# Patient Record
Sex: FEMALE | Race: OTHER | HISPANIC OR LATINO | Employment: FULL TIME | URBAN - METROPOLITAN AREA
[De-identification: names, ages, dates, MRNs, and addresses within clinical notes are randomized per-mention and may not be internally consistent; named-entity substitution may affect disease eponyms.]

---

## 2017-02-13 ENCOUNTER — HOSPITAL ENCOUNTER (OUTPATIENT)
Facility: HOSPITAL | Age: 22
Discharge: HOME | End: 2017-02-13
Attending: OBSTETRICS & GYNECOLOGY | Admitting: OBSTETRICS & GYNECOLOGY
Payer: COMMERCIAL

## 2017-02-13 PROBLEM — O20.9 VAGINAL BLEEDING BEFORE 22 WEEKS GESTATION: Status: ACTIVE | Noted: 2017-02-13

## 2017-02-13 PROCEDURE — 87086 URINE CULTURE/COLONY COUNT: CPT | Performed by: OBSTETRICS & GYNECOLOGY

## 2017-02-13 PROCEDURE — 99203 OFFICE O/P NEW LOW 30 MIN: CPT

## 2017-02-13 PROCEDURE — 81001 URINALYSIS AUTO W/SCOPE: CPT | Performed by: OBSTETRICS & GYNECOLOGY

## 2017-02-13 RX ORDER — METRONIDAZOLE 500 MG/1
500 TABLET ORAL 2 TIMES DAILY
Qty: 14 TABLET | Refills: 0 | Status: SHIPPED | OUTPATIENT
Start: 2017-02-13 | End: 2017-02-20

## 2017-02-14 VITALS
HEART RATE: 90 BPM | RESPIRATION RATE: 16 BRPM | SYSTOLIC BLOOD PRESSURE: 119 MMHG | TEMPERATURE: 98.4 F | DIASTOLIC BLOOD PRESSURE: 63 MMHG | OXYGEN SATURATION: 100 %

## 2017-02-14 LAB
BACTERIA UR QL AUTO: ABNORMAL /HPF
BILIRUB UR QL STRIP: NEGATIVE
CLARITY UR: CLEAR
COLOR UR: YELLOW
GLUCOSE UR STRIP-MCNC: NEGATIVE MG/DL
HGB UR QL STRIP.AUTO: NEGATIVE
KETONES UR STRIP-MCNC: NEGATIVE MG/DL
LEUKOCYTE ESTERASE UR QL STRIP: ABNORMAL
NITRITE UR QL STRIP: NEGATIVE
NON-SQ EPI CELLS URNS QL MICRO: ABNORMAL /HPF
PH UR STRIP.AUTO: 7 [PH] (ref 4.5–8)
PROT UR STRIP-MCNC: NEGATIVE MG/DL
RBC #/AREA URNS AUTO: ABNORMAL /HPF
SP GR UR STRIP.AUTO: 1.01 (ref 1–1.03)
UROBILINOGEN UR QL STRIP.AUTO: 0.2 E.U./DL
WBC #/AREA URNS AUTO: ABNORMAL /HPF

## 2017-02-15 LAB — BACTERIA UR CULT: NORMAL

## 2017-07-19 ENCOUNTER — HOSPITAL ENCOUNTER (EMERGENCY)
Facility: HOSPITAL | Age: 22
Discharge: HOME | End: 2017-07-20
Attending: EMERGENCY MEDICINE
Payer: COMMERCIAL

## 2017-07-19 DIAGNOSIS — N76.4 LABIAL ABSCESS: Primary | ICD-10-CM

## 2017-07-19 RX ORDER — IBUPROFEN 600 MG/1
600 TABLET ORAL ONCE
Status: COMPLETED | OUTPATIENT
Start: 2017-07-19 | End: 2017-07-19

## 2017-07-19 RX ORDER — OXYCODONE HYDROCHLORIDE AND ACETAMINOPHEN 5; 325 MG/1; MG/1
1 TABLET ORAL ONCE
Status: COMPLETED | OUTPATIENT
Start: 2017-07-19 | End: 2017-07-19

## 2017-07-19 RX ADMIN — OXYCODONE HYDROCHLORIDE AND ACETAMINOPHEN 1 TABLET: 5; 325 TABLET ORAL at 23:12

## 2017-07-19 RX ADMIN — IBUPROFEN 600 MG: 600 TABLET, FILM COATED ORAL at 23:12

## 2017-07-20 VITALS
TEMPERATURE: 99.2 F | SYSTOLIC BLOOD PRESSURE: 132 MMHG | OXYGEN SATURATION: 99 % | DIASTOLIC BLOOD PRESSURE: 77 MMHG | RESPIRATION RATE: 16 BRPM | HEART RATE: 87 BPM

## 2017-07-20 PROCEDURE — 99283 EMERGENCY DEPT VISIT LOW MDM: CPT

## 2017-07-20 RX ORDER — OXYCODONE HYDROCHLORIDE AND ACETAMINOPHEN 5; 325 MG/1; MG/1
1 TABLET ORAL EVERY 8 HOURS PRN
Qty: 15 TABLET | Refills: 0 | Status: SHIPPED | OUTPATIENT
Start: 2017-07-20 | End: 2017-07-25

## 2017-07-20 RX ORDER — CLINDAMYCIN HYDROCHLORIDE 150 MG/1
300 CAPSULE ORAL ONCE
Status: COMPLETED | OUTPATIENT
Start: 2017-07-20 | End: 2017-07-20

## 2017-07-20 RX ORDER — CLINDAMYCIN HYDROCHLORIDE 150 MG/1
300 CAPSULE ORAL EVERY 8 HOURS SCHEDULED
Qty: 21 CAPSULE | Refills: 0 | Status: SHIPPED | OUTPATIENT
Start: 2017-07-20 | End: 2017-07-20

## 2017-07-20 RX ORDER — CLINDAMYCIN HYDROCHLORIDE 150 MG/1
300 CAPSULE ORAL EVERY 8 HOURS SCHEDULED
Qty: 42 CAPSULE | Refills: 0 | Status: SHIPPED | OUTPATIENT
Start: 2017-07-20 | End: 2017-07-27

## 2017-07-20 RX ORDER — IBUPROFEN 800 MG/1
800 TABLET ORAL EVERY 8 HOURS PRN
Qty: 30 TABLET | Refills: 0 | Status: SHIPPED | OUTPATIENT
Start: 2017-07-20 | End: 2018-08-23

## 2017-07-20 RX ADMIN — CLINDAMYCIN HYDROCHLORIDE 300 MG: 150 CAPSULE ORAL at 01:09

## 2017-08-02 ENCOUNTER — HOSPITAL ENCOUNTER (EMERGENCY)
Facility: HOSPITAL | Age: 22
Discharge: HOME | End: 2017-08-02
Payer: COMMERCIAL

## 2017-08-02 VITALS
HEART RATE: 76 BPM | OXYGEN SATURATION: 100 % | DIASTOLIC BLOOD PRESSURE: 66 MMHG | SYSTOLIC BLOOD PRESSURE: 136 MMHG | RESPIRATION RATE: 16 BRPM | TEMPERATURE: 97.9 F | WEIGHT: 203 LBS

## 2017-08-02 DIAGNOSIS — S91.111A LACERATION OF RIGHT GREAT TOE WITHOUT FOREIGN BODY WITHOUT DAMAGE TO NAIL, INITIAL ENCOUNTER: Primary | ICD-10-CM

## 2017-08-02 DIAGNOSIS — Z23 NEED FOR TETANUS BOOSTER: ICD-10-CM

## 2017-08-02 PROCEDURE — 99282 EMERGENCY DEPT VISIT SF MDM: CPT

## 2017-08-02 PROCEDURE — 90715 TDAP VACCINE 7 YRS/> IM: CPT | Performed by: PHYSICIAN ASSISTANT

## 2017-08-02 PROCEDURE — 90471 IMMUNIZATION ADMIN: CPT

## 2017-08-02 RX ADMIN — TETANUS TOXOID, REDUCED DIPHTHERIA TOXOID AND ACELLULAR PERTUSSIS VACCINE, ADSORBED 0.5 ML: 5; 2.5; 8; 8; 2.5 SUSPENSION INTRAMUSCULAR at 22:41

## 2018-03-28 ENCOUNTER — HOSPITAL ENCOUNTER (EMERGENCY)
Facility: HOSPITAL | Age: 23
Discharge: HOME | End: 2018-03-28
Admitting: EMERGENCY MEDICINE
Payer: MEDICARE

## 2018-03-28 VITALS
HEART RATE: 94 BPM | WEIGHT: 187 LBS | RESPIRATION RATE: 16 BRPM | OXYGEN SATURATION: 98 % | DIASTOLIC BLOOD PRESSURE: 81 MMHG | TEMPERATURE: 99.5 F | SYSTOLIC BLOOD PRESSURE: 137 MMHG

## 2018-03-28 DIAGNOSIS — J02.9 PHARYNGITIS: Primary | ICD-10-CM

## 2018-03-28 LAB — S PYO AG THROAT QL: NEGATIVE

## 2018-03-28 PROCEDURE — 99283 EMERGENCY DEPT VISIT LOW MDM: CPT

## 2018-03-28 PROCEDURE — 87070 CULTURE OTHR SPECIMN AEROBIC: CPT | Performed by: PHYSICIAN ASSISTANT

## 2018-03-28 PROCEDURE — 87430 STREP A AG IA: CPT | Performed by: PHYSICIAN ASSISTANT

## 2018-03-28 NOTE — DISCHARGE INSTRUCTIONS

## 2018-03-28 NOTE — ED PROVIDER NOTES
History  Chief Complaint   Patient presents with    Sore Throat     Sore throat since Sunday  Also had fever of 103 yesterday  Has been taking Tylenol  Also had diarrhea Sunday and Monday  23year old female presents today complaining of fever and sore throat for the last 3 days  Also admits to "a bit of a cough" and diarrhea  Denies sputum production  Has been taking tylenol for her symptoms  Family member sick with same symptoms  Denies headache, neck pain, chest pain, shortness of breath, abdominal pain, nausea, vomiting  Denies past medical history  Prior to Admission Medications   Prescriptions Last Dose Informant Patient Reported? Taking? Prenatal Multivit-Min-Fe-FA (PRENATAL VITAMINS PO)   Yes No   Sig: Take 1 tablet by mouth   ibuprofen (MOTRIN) 800 mg tablet   No No   Sig: Take 1 tablet by mouth every 8 (eight) hours as needed for mild pain or moderate pain for up to 10 days      Facility-Administered Medications: None       Past Medical History:   Diagnosis Date    Medical history non-contributory        Past Surgical History:   Procedure Laterality Date    NO PAST SURGERIES         History reviewed  No pertinent family history  I have reviewed and agree with the history as documented  Social History   Substance Use Topics    Smoking status: Never Smoker    Smokeless tobacco: Never Used    Alcohol use No        Review of Systems   Constitutional: Positive for fever  HENT: Positive for sore throat  Respiratory: Positive for cough  All other systems reviewed and are negative        Physical Exam  ED Triage Vitals [03/28/18 1526]   Temperature Pulse Respirations Blood Pressure SpO2   99 5 °F (37 5 °C) 94 16 137/81 98 %      Temp Source Heart Rate Source Patient Position - Orthostatic VS BP Location FiO2 (%)   Temporal Monitor Sitting Right arm --      Pain Score       8           Orthostatic Vital Signs  Vitals:    03/28/18 1526   BP: 137/81   Pulse: 94   Patient Position - Orthostatic VS: Sitting       Physical Exam   Constitutional: She appears well-developed and well-nourished  No distress  HENT:   Head: Normocephalic and atraumatic  Mouth/Throat: Oropharynx is clear and moist  No oropharyngeal exudate  Eyes: Conjunctivae and EOM are normal    Neck: Normal range of motion  Neck supple  Cardiovascular: Normal rate, regular rhythm and normal heart sounds  Pulmonary/Chest: Effort normal and breath sounds normal  No respiratory distress  She has no wheezes  Abdominal: Soft  She exhibits no distension  There is no tenderness  Musculoskeletal: Normal range of motion  Neurological: She is alert  Skin: Skin is warm and dry  Capillary refill takes less than 2 seconds  No rash noted  She is not diaphoretic  Psychiatric: She has a normal mood and affect  ED Medications  Medications - No data to display    Diagnostic Studies  Results Reviewed     Procedure Component Value Units Date/Time    Rapid Beta strep screen [[id number]]  (Normal) Collected:  03/28/18 1628    Lab Status:  Final result Specimen:  Throat from Throat Updated:  03/28/18 1715     Rapid Strep A Screen Negative    Throat culture [[id number]] Collected:  03/28/18 1628    Lab Status: In process Specimen:  Throat from Throat Updated:  03/28/18 1715                 No orders to display              Procedures  Procedures       Phone Contacts  ED Phone Contact    ED Course  ED Course                                MDM  CritCare Time    Disposition  Final diagnoses:   Pharyngitis     Time reflects when diagnosis was documented in both MDM as applicable and the Disposition within this note     Time User Action Codes Description Comment    3/28/2018  5:33 PM Sussy Benítez Add [Z77 21,  W27  3XXA] Accidental needlestick injury with exposure to body fluid     3/28/2018  5:34 PM Sussy Benítez Remove [I12 35,  W27  3XXA] Accidental needlestick injury with exposure to body fluid     3/28/2018  5:34 PM [address] [J02 9] Pharyngitis       ED Disposition     ED Disposition Condition Comment    Discharge  Castro Abdi discharge to home/self care  Condition at discharge: Good        Follow-up Information     Follow up With Specialties Details Why [address] [facility] Family Medicine Schedule an appointment as soon as possible for a visit  [address] [place] [address]  [id number]  [phone]        Patient's Medications   Discharge Prescriptions    No medications on file     No discharge procedures on file      ED Provider  Electronically Signed by           Dharmesh Carreon PA-C  03/28/18 1734

## 2018-03-30 LAB — BACTERIA THROAT CULT: NORMAL

## 2018-08-22 ENCOUNTER — HOSPITAL ENCOUNTER (EMERGENCY)
Facility: HOSPITAL | Age: 23
Discharge: HOME | End: 2018-08-22
Attending: EMERGENCY MEDICINE
Payer: MEDICARE

## 2018-08-22 VITALS
DIASTOLIC BLOOD PRESSURE: 67 MMHG | HEART RATE: 97 BPM | TEMPERATURE: 97.5 F | RESPIRATION RATE: 18 BRPM | OXYGEN SATURATION: 98 % | SYSTOLIC BLOOD PRESSURE: 138 MMHG

## 2018-08-22 DIAGNOSIS — L50.9 URTICARIA: Primary | ICD-10-CM

## 2018-08-22 DIAGNOSIS — T78.40XA ALLERGIC REACTION, INITIAL ENCOUNTER: ICD-10-CM

## 2018-08-22 PROCEDURE — 99283 EMERGENCY DEPT VISIT LOW MDM: CPT

## 2018-08-22 RX ORDER — PREDNISONE 10 MG/1
TABLET ORAL
Qty: 42 TABLET | Refills: 0 | Status: SHIPPED | OUTPATIENT
Start: 2018-08-22 | End: 2022-01-28 | Stop reason: ALTCHOICE

## 2018-08-22 RX ORDER — PREDNISONE 20 MG/1
60 TABLET ORAL ONCE
Status: COMPLETED | OUTPATIENT
Start: 2018-08-22 | End: 2018-08-22

## 2018-08-22 RX ORDER — FAMOTIDINE 20 MG/1
20 TABLET, FILM COATED ORAL ONCE
Status: COMPLETED | OUTPATIENT
Start: 2018-08-22 | End: 2018-08-22

## 2018-08-22 RX ORDER — FAMOTIDINE 20 MG/1
20 TABLET, FILM COATED ORAL 2 TIMES DAILY
Qty: 10 TABLET | Refills: 0 | Status: SHIPPED | OUTPATIENT
Start: 2018-08-22 | End: 2018-08-27

## 2018-08-22 RX ORDER — DIPHENHYDRAMINE HCL 25 MG
25 TABLET ORAL EVERY 6 HOURS PRN
Qty: 30 TABLET | Refills: 0 | Status: SHIPPED | OUTPATIENT
Start: 2018-08-22

## 2018-08-22 RX ORDER — DIPHENHYDRAMINE HCL 25 MG
50 TABLET ORAL ONCE
Status: COMPLETED | OUTPATIENT
Start: 2018-08-22 | End: 2018-08-22

## 2018-08-22 RX ADMIN — PREDNISONE 60 MG: 20 TABLET ORAL at 12:54

## 2018-08-22 RX ADMIN — DIPHENHYDRAMINE HCL 50 MG: 25 TABLET ORAL at 12:54

## 2018-08-22 RX ADMIN — FAMOTIDINE 20 MG: 20 TABLET ORAL at 12:54

## 2018-08-22 NOTE — ED PROVIDER NOTES
History  Chief Complaint   Patient presents with    Allergic Reaction     pt reports allergic reaction to unknown source  pt c/o hives to generalized body, swelling of eye left greater than right  pt reports recent cold symptoms taking medication but reports hx of medication use  pt denies difficulty swallowing or breathing  pt reports swelling since 0700 today     Patient is a 38-year-old female with no significant past medical history presents for evaluation of hives and eye swelling  She states that she woke up this morning around 7 at a m  and her left upper eyelid and right lower eyelid were swollen and mildly itchy  She states that she went to work and started with itching to her body and noticed hives  Her eyelid swelling worsened so she came in for evaluation  She has not taken any medications for her symptoms  She states she's had a mild cold over the last 2 days including sore throat, cough, runny nose, sneezing and mild headache  She has been taking dayquil and flonase  She states no other new medications and no hx of same in the past with these medications  She has an allergy to peanut oil but she denies coming into contact with this to her knowledge  She denies any new foods, soaps, lotions, detergents, etc  No sick contacts  She denies fever, chills, syncope, lightheadedness, nausea, vomiting, diarrhea, shortness of breath, wheezing, stridor, trouble swallowing, other facial swelling, tongue/lip swelling,             Prior to Admission Medications   Prescriptions Last Dose Informant Patient Reported? Taking?    Prenatal Multivit-Min-Fe-FA (PRENATAL VITAMINS PO)   Yes No   Sig: Take 1 tablet by mouth   ibuprofen (MOTRIN) 800 mg tablet   No No   Sig: Take 1 tablet by mouth every 8 (eight) hours as needed for mild pain or moderate pain for up to 10 days      Facility-Administered Medications: None       Past Medical History:   Diagnosis Date    Medical history non-contributory        Past Surgical History:   Procedure Laterality Date    NO PAST SURGERIES         History reviewed  No pertinent family history  I have reviewed and agree with the history as documented  Social History   Substance Use Topics    Smoking status: Never Smoker    Smokeless tobacco: Never Used    Alcohol use No        Review of Systems   Constitutional: Negative for chills and fever  HENT: Positive for congestion, facial swelling, sneezing and sore throat  Negative for ear pain and trouble swallowing  Eyelid swelling   Eyes: Positive for itching  Negative for pain and visual disturbance  Respiratory: Negative for shortness of breath, wheezing and stridor  Cardiovascular: Negative for chest pain and palpitations  Gastrointestinal: Negative for abdominal pain, diarrhea, nausea and vomiting  Musculoskeletal: Negative for arthralgias  Skin: Positive for rash  Hives to body   Neurological: Negative for dizziness, syncope, light-headedness and headaches  All other systems reviewed and are negative  Physical Exam  Physical Exam   Constitutional: She is oriented to person, place, and time  Vital signs are normal  She appears well-developed and well-nourished  She is active  Non-toxic appearance  No distress  HENT:   Head: Normocephalic and atraumatic  Right Ear: External ear normal    Left Ear: External ear normal    Nose: Nose normal    Mouth/Throat: Oropharynx is clear and moist  No oropharyngeal exudate  Eyes: Conjunctivae and EOM are normal  Pupils are equal, round, and reactive to light  Right eye exhibits no discharge, no exudate and no hordeolum  No foreign body present in the right eye  Left eye exhibits no discharge, no exudate and no hordeolum  No foreign body present in the left eye  Left upper eyelid swelling, right lower eyelid swelling  Mild itch  No cobblestoning noted to conjunctiva  No FB noted  No scleral injection/redness  No stye or drainage noted      Neck: Normal range of motion  Cardiovascular: Normal rate, regular rhythm and normal heart sounds  Exam reveals no gallop and no friction rub  No murmur heard  Pulmonary/Chest: Effort normal and breath sounds normal  No respiratory distress  She has no wheezes  She has no rales  Abdominal: Soft  Bowel sounds are normal  She exhibits no distension  There is no tenderness  There is no guarding  Musculoskeletal: Normal range of motion  Neurological: She is alert and oriented to person, place, and time  Skin: Skin is warm and dry  Capillary refill takes less than 2 seconds  She is not diaphoretic  Few isolated urticaria noted to the body (right flank, back, thighs)  Pruritic and blanchable  Psychiatric: She has a normal mood and affect  Her behavior is normal    Nursing note and vitals reviewed  Vital Signs  ED Triage Vitals [08/22/18 1218]   Temperature Pulse Respirations Blood Pressure SpO2   97 5 °F (36 4 °C) 97 18 138/67 98 %      Temp Source Heart Rate Source Patient Position - Orthostatic VS BP Location FiO2 (%)   Temporal Monitor Sitting Right arm --      Pain Score       --           Vitals:    08/22/18 1218   BP: 138/67   Pulse: 97   Patient Position - Orthostatic VS: Sitting       Visual Acuity      ED Medications  Medications   diphenhydrAMINE (BENADRYL) tablet 50 mg (50 mg Oral Given 8/22/18 1254)   predniSONE tablet 60 mg (60 mg Oral Given 8/22/18 1254)   famotidine (PEPCID) tablet 20 mg (20 mg Oral Given 8/22/18 1254)       Diagnostic Studies  Results Reviewed     None                 No orders to display              Procedures  Procedures       Phone Contacts  ED Phone Contact    ED Course                               MDM  Number of Diagnoses or Management Options  Allergic reaction, initial encounter:   Urticaria:   Diagnosis management comments: Allergic reaction/urticaria that started this morning at 7am  No medications taken for her symptoms  No GI or respiratory involvement   No lightheadedness or syncope  She is well appearing, non toxic and in NAD  Vital signs are stable  Will give benadryl, prednisone, and pepcid and observe  Patient with improvement of urticaria and eyelid swelling  Still with some left eyelid swelling but patient feels like it is getting better  Patient discharged after 1hr observation (7hrs after onset of symptoms) with no worsening symptoms  Patient stable at discharge  Will discharge home with prednisone taper, benadryl and pepcid  Discontinue OTC medications she was taking  Instructed to follow up with PCP and discussed strict return precautions  Patient states understanding and agrees with plan  Patient Progress  Patient progress: stable    CritCare Time    Disposition  Final diagnoses:   Urticaria   Allergic reaction, initial encounter     Time reflects when diagnosis was documented in both MDM as applicable and the Disposition within this note     Time User Action Codes Description Comment    8/22/2018  2:09 PM Radha Marie Add [L50 9] Urticaria     8/22/2018  2:09 PM Radha Marie Add [T78 40XA] Allergic reaction, initial encounter       ED Disposition     ED Disposition Condition Comment    Discharge  Tio Houston discharge to home/self care      Condition at discharge: Good        Follow-up Information     Follow up With Specialties Details Why Contact Info Additional [id number] [facility] Internal Medicine Schedule an appointment as soon as possible for a visit in 1 day  [facility] [phone] [facility] Family Medicine Schedule an appointment as soon as possible for a visit As needed if you do not have a doctor  [address] [place] [address] [address] [address] [phone]       [address] Ophthalmology Schedule an appointment as soon as possible for a visit in 1 day  [address] [facility] [address] Emergency Department Emergency Medicine  If symptoms worsen or new symptoms arise as discussed  [address] [facility] ED, [address] [facility]  , [place], [place], [address]          Discharge Medication List as of 8/22/2018  2:16 PM      START taking these medications    Details   diphenhydrAMINE (BENADRYL) 25 mg tablet Take 1 tablet (25 mg total) by mouth every 6 (six) hours as needed for itching, Starting Wed 8/22/2018, Print      famotidine (PEPCID) 20 mg tablet Take 1 tablet (20 mg total) by mouth 2 (two) times a day for 5 days, Starting Wed 8/22/2018, Until Mon 8/27/2018, Print      predniSONE 10 mg tablet Take 6 tabs day 1 and 2, take 5 tabs day 3 and 4, take 4 tabs day 5 and 6, take 3 tabs day 7 and 8, take 2 tabs day 9 and 10, take 1 tab day 11 and 12, Print         CONTINUE these medications which have NOT CHANGED    Details   ibuprofen (MOTRIN) 800 mg tablet Take 1 tablet by mouth every 8 (eight) hours as needed for mild pain or moderate pain for up to 10 days, Starting u 7/20/2017, Until Sun 7/30/2017, Print      Prenatal Multivit-Min-Fe-FA (PRENATAL VITAMINS PO) Take 1 tablet by mouth, Until Discontinued, Historical Med           No discharge procedures on file      ED Provider  Electronically Signed by           Demetri Castillo PA-C  08/22/18 1704 [facility]  08/22/18 4196

## 2018-08-22 NOTE — DISCHARGE INSTRUCTIONS
Urticaria   WHAT YOU NEED TO KNOW:   Urticaria is also called hives  Hives can change size and shape, and appear anywhere on your skin  They can be mild or severe and last from a few minutes to a few days  Hives may be a sign of a severe allergic reaction called anaphylaxis that needs immediate treatment  Urticaria that lasts longer than 6 weeks may be a chronic condition that needs long-term treatment  DISCHARGE INSTRUCTIONS:   Call 911 for signs or symptoms of anaphylaxis,  such as trouble breathing, swelling in your mouth or throat, or wheezing  You may also have itching, a rash, or feel like you are going to faint  Return to the emergency department if:   · Your heart is beating faster than it normally does  · You have cramping or severe pain in your abdomen  Contact your healthcare provider if:   · You have a fever  · Your skin still itches 24 hours after you take your medicine  · You still have hives after 7 days  · Your joints are painful and swollen  · You have questions or concerns about your condition or care  Medicines:   · Epinephrine  is used to treat severe allergic reactions such as anaphylaxis  · Antihistamines  decrease mild symptoms such as itching or a rash  · Steroids  decrease redness, pain, and swelling  · Take your medicine as directed  Contact your healthcare provider if you think your medicine is not helping or if you have side effects  Tell him of her if you are allergic to any medicine  Keep a list of the medicines, vitamins, and herbs you take  Include the amounts, and when and why you take them  Bring the list or the pill bottles to follow-up visits  Carry your medicine list with you in case of an emergency  Steps to take for signs or symptoms of anaphylaxis:   · Immediately  give 1 shot of epinephrine only into the outer thigh muscle  · Leave the shot in place  as directed   Your healthcare provider may recommend you leave it in place for up to 10 seconds before you remove it  This helps make sure all of the epinephrine is delivered  · Call 911 and go to the emergency department,  even if the shot improved symptoms  Do not drive yourself  Bring the used epinephrine shot with you  Safety precautions to take if you are at risk for anaphylaxis:   · Keep 2 shots of epinephrine with you at all times  You may need a second shot, because epinephrine only works for about 20 minutes and symptoms may return  Your healthcare provider can show you and family members how to give the shot  Check the expiration date every month and replace it before it expires  · Create an action plan  Your healthcare provider can help you create a written plan that explains the allergy and an emergency plan to treat a reaction  The plan explains when to give a second epinephrine shot if symptoms return or do not improve after the first  Give copies of the action plan and emergency instructions to family members, work and school staff, and  providers  Show them how to give a shot of epinephrine  · Be careful when you exercise  If you have had exercise-induced anaphylaxis, do not exercise right after you eat  Stop exercising right away if you start to develop any signs or symptoms of anaphylaxis  You may first feel tired, warm, or have itchy skin  Hives, swelling, and severe breathing problems may develop if you continue to exercise  · Carry medical alert identification  Wear medical alert jewelry or carry a card that explains the allergy  Ask your healthcare provider where to get these items  · Keep a record of triggers and symptoms  Record everything you eat, drink, or apply to your skin for 3 weeks  Include stressful events and what you were doing right before your hives started  Bring the record with you to follow-up visits with your healthcare provider  Manage urticaria:   · Cool your skin  This may help decrease itching   Apply a cool pack to your hives  Dip a hand towel in cool water, wring it out, and place it on your hives  You may also soak your skin in a cool oatmeal bath  · Do not rub your hives  This can irritate your skin and cause more hives  · Wear loose clothing  Tight clothes may irritate your skin and cause more hives  · Manage stress  Stress may trigger hives, or make them worse  Learn new ways to relax, such as deep breathing  Follow up with your healthcare provider as directed:  Write down your questions so you remember to ask them during your visits  © 2017 [employer]  Information is for End User's use only and may not be sold, redistributed or otherwise used for commercial purposes  All illustrations and images included in CareNotes® are the copyrighted property of [employer]  or [employer]  The above information is an  only  It is not intended as medical advice for individual conditions or treatments  Talk to your doctor, nurse or pharmacist before following any medical regimen to see if it is safe and effective for you  General Allergic Reaction   WHAT YOU NEED TO KNOW:   An allergic reaction is your body's response to an allergen  Allergens include medicines, food, insect stings, animal dander, mold, latex, chemicals, and dust mites  Pollen from trees, grass, and weeds can also cause an allergic reaction  DISCHARGE INSTRUCTIONS:   Return to the emergency department if:   · You have a skin rash, hives, swelling, or itching that gets worse  · You have trouble breathing, shortness of breath, wheezing, or coughing  · Your throat tightens, or your lips or tongue swell  · You have trouble swallowing or speaking  · You have dizziness, lightheadedness, fainting, or confusion  · You have nausea, vomiting, diarrhea, or abdominal cramps  · You have chest pain or tightness    Contact your healthcare provider if:   · You have questions or concerns about your condition or care  Medicines:   · Medicines  may be given to relieve certain allergy symptoms such as itching, sneezing, and swelling  You may take them as a pill or use drops in your nose or eyes  Topical treatments may be given to put directly on your skin to help decrease itching or swelling  · Take your medicine as directed  Contact your healthcare provider if you think your medicine is not helping or if you have side effects  Tell him of her if you are allergic to any medicine  Keep a list of the medicines, vitamins, and herbs you take  Include the amounts, and when and why you take them  Bring the list or the pill bottles to follow-up visits  Carry your medicine list with you in case of an emergency  Follow up with your healthcare provider as directed:  Write down your questions so you remember to ask them during your visits  Self-care:   · Avoid the allergen  that you think may have caused your allergic reaction  · Use cold compresses  on your skin or eyes if they were affected by the allergic reaction  Cold compresses may help to soothe your skin or eyes  · Rinse your nasal passages  with a saline solution  Daily rinsing may help clear your nose of allergens  · Do not smoke  Your allergy symptoms may decrease if you are not around smoke  Nicotine and other chemicals in cigarettes and cigars can also cause lung damage  Ask your healthcare provider for information if you currently smoke and need help to quit  E-cigarettes or smokeless tobacco still contain nicotine  Talk to your healthcare provider before you use these products  © 2017 [employer]  Information is for End User's use only and may not be sold, redistributed or otherwise used for commercial purposes  All illustrations and images included in CareNotes® are the copyrighted property of [employer]  or [employer]  The above information is an  only   It is not intended as medical advice for individual conditions or treatments  Talk to your doctor, nurse or pharmacist before following any medical regimen to see if it is safe and effective for you

## 2018-08-23 ENCOUNTER — HOSPITAL ENCOUNTER (EMERGENCY)
Facility: HOSPITAL | Age: 23
Discharge: HOME | End: 2018-08-23
Attending: EMERGENCY MEDICINE | Admitting: EMERGENCY MEDICINE
Payer: MEDICARE

## 2018-08-23 VITALS
HEART RATE: 113 BPM | RESPIRATION RATE: 16 BRPM | OXYGEN SATURATION: 96 % | TEMPERATURE: 99.1 F | SYSTOLIC BLOOD PRESSURE: 127 MMHG | WEIGHT: 191.58 LBS | DIASTOLIC BLOOD PRESSURE: 69 MMHG

## 2018-08-23 DIAGNOSIS — L50.9 URTICARIA: ICD-10-CM

## 2018-08-23 DIAGNOSIS — T78.40XA ALLERGIC REACTION, INITIAL ENCOUNTER: Primary | ICD-10-CM

## 2018-08-23 PROCEDURE — 96374 THER/PROPH/DIAG INJ IV PUSH: CPT

## 2018-08-23 PROCEDURE — 99284 EMERGENCY DEPT VISIT MOD MDM: CPT

## 2018-08-23 PROCEDURE — 96372 THER/PROPH/DIAG INJ SC/IM: CPT

## 2018-08-23 RX ORDER — EPINEPHRINE 0.3 MG/.3ML
0.3 INJECTION SUBCUTANEOUS ONCE
Qty: 0.3 ML | Refills: 1 | Status: SHIPPED | OUTPATIENT
Start: 2018-08-23 | End: 2018-08-24

## 2018-08-23 RX ORDER — EPINEPHRINE 1 MG/ML
0.3 INJECTION, SOLUTION, CONCENTRATE INTRAVENOUS ONCE
Status: COMPLETED | OUTPATIENT
Start: 2018-08-23 | End: 2018-08-23

## 2018-08-23 RX ORDER — DIPHENHYDRAMINE HYDROCHLORIDE 50 MG/ML
25 INJECTION INTRAMUSCULAR; INTRAVENOUS ONCE
Status: COMPLETED | OUTPATIENT
Start: 2018-08-23 | End: 2018-08-23

## 2018-08-23 RX ADMIN — EPINEPHRINE 0.3 MG: 1 INJECTION, SOLUTION, CONCENTRATE INTRAVENOUS at 09:59

## 2018-08-23 RX ADMIN — DIPHENHYDRAMINE HYDROCHLORIDE 25 MG: 50 INJECTION, SOLUTION INTRAMUSCULAR; INTRAVENOUS at 10:01

## 2018-08-23 NOTE — ED NOTES
Pt states feeling better and says "I feel I can breath now"  Pt generalized hives improving at this time        Britney Childress RN  08/23/18 1016

## 2018-08-23 NOTE — DISCHARGE INSTRUCTIONS
General Allergic Reaction   WHAT YOU NEED TO KNOW:   An allergic reaction is your body's response to an allergen  Allergens include medicines, food, insect stings, animal dander, mold, latex, chemicals, and dust mites  Pollen from trees, grass, and weeds can also cause an allergic reaction  DISCHARGE INSTRUCTIONS:   Return to the emergency department if:   · You have a skin rash, hives, swelling, or itching that gets worse  · You have trouble breathing, shortness of breath, wheezing, or coughing  · Your throat tightens, or your lips or tongue swell  · You have trouble swallowing or speaking  · You have dizziness, lightheadedness, fainting, or confusion  · You have nausea, vomiting, diarrhea, or abdominal cramps  · You have chest pain or tightness  Contact your healthcare provider if:   · You have questions or concerns about your condition or care  Medicines:   · Medicines  may be given to relieve certain allergy symptoms such as itching, sneezing, and swelling  You may take them as a pill or use drops in your nose or eyes  Topical treatments may be given to put directly on your skin to help decrease itching or swelling  · Take your medicine as directed  Contact your healthcare provider if you think your medicine is not helping or if you have side effects  Tell him of her if you are allergic to any medicine  Keep a list of the medicines, vitamins, and herbs you take  Include the amounts, and when and why you take them  Bring the list or the pill bottles to follow-up visits  Carry your medicine list with you in case of an emergency  Follow up with your healthcare provider as directed:  Write down your questions so you remember to ask them during your visits  Self-care:   · Avoid the allergen  that you think may have caused your allergic reaction  · Use cold compresses  on your skin or eyes if they were affected by the allergic reaction   Cold compresses may help to soothe your skin or eyes     · Rinse your nasal passages  with a saline solution  Daily rinsing may help clear your nose of allergens  · Do not smoke  Your allergy symptoms may decrease if you are not around smoke  Nicotine and other chemicals in cigarettes and cigars can also cause lung damage  Ask your healthcare provider for information if you currently smoke and need help to quit  E-cigarettes or smokeless tobacco still contain nicotine  Talk to your healthcare provider before you use these products  © 2017 [employer]  Information is for End User's use only and may not be sold, redistributed or otherwise used for commercial purposes  All illustrations and images included in CareNotes® are the copyrighted property of A D A M [employer]  or [employer] Mcgu[employer]  The above information is an  only  It is not intended as medical advice for individual conditions or treatments  Talk to your doctor, nurse or pharmacist before following any medical regimen to see if it is safe and effective for you  Urticaria   WHAT YOU NEED TO KNOW:   Urticaria is also called hives  Hives can change size and shape, and appear anywhere on your skin  They can be mild or severe and last from a few minutes to a few days  Hives may be a sign of a severe allergic reaction called anaphylaxis that needs immediate treatment  Urticaria that lasts longer than 6 weeks may be a chronic condition that needs long-term treatment  DISCHARGE INSTRUCTIONS:   Call 911 for signs or symptoms of anaphylaxis,  such as trouble breathing, swelling in your mouth or throat, or wheezing  You may also have itching, a rash, or feel like you are going to faint  Return to the emergency department if:   · Your heart is beating faster than it normally does  · You have cramping or severe pain in your abdomen  Contact your healthcare provider if:   · You have a fever  · Your skin still itches 24 hours after you take your medicine      · You still have hives after 7 days  · Your joints are painful and swollen  · You have questions or concerns about your condition or care  Medicines:   · Epinephrine  is used to treat severe allergic reactions such as anaphylaxis  · Antihistamines  decrease mild symptoms such as itching or a rash  · Steroids  decrease redness, pain, and swelling  · Take your medicine as directed  Contact your healthcare provider if you think your medicine is not helping or if you have side effects  Tell him of her if you are allergic to any medicine  Keep a list of the medicines, vitamins, and herbs you take  Include the amounts, and when and why you take them  Bring the list or the pill bottles to follow-up visits  Carry your medicine list with you in case of an emergency  Steps to take for signs or symptoms of anaphylaxis:   · Immediately  give 1 shot of epinephrine only into the outer thigh muscle  · Leave the shot in place  as directed  Your healthcare provider may recommend you leave it in place for up to 10 seconds before you remove it  This helps make sure all of the epinephrine is delivered  · Call 911 and go to the emergency department,  even if the shot improved symptoms  Do not drive yourself  Bring the used epinephrine shot with you  Safety precautions to take if you are at risk for anaphylaxis:   · Keep 2 shots of epinephrine with you at all times  You may need a second shot, because epinephrine only works for about 20 minutes and symptoms may return  Your healthcare provider can show you and family members how to give the shot  Check the expiration date every month and replace it before it expires  · Create an action plan  Your healthcare provider can help you create a written plan that explains the allergy and an emergency plan to treat a reaction   The plan explains when to give a second epinephrine shot if symptoms return or do not improve after the first  Give copies of the action plan and emergency instructions to family members, work and school staff, and  providers  Show them how to give a shot of epinephrine  · Be careful when you exercise  If you have had exercise-induced anaphylaxis, do not exercise right after you eat  Stop exercising right away if you start to develop any signs or symptoms of anaphylaxis  You may first feel tired, warm, or have itchy skin  Hives, swelling, and severe breathing problems may develop if you continue to exercise  · Carry medical alert identification  Wear medical alert jewelry or carry a card that explains the allergy  Ask your healthcare provider where to get these items  · Keep a record of triggers and symptoms  Record everything you eat, drink, or apply to your skin for 3 weeks  Include stressful events and what you were doing right before your hives started  Bring the record with you to follow-up visits with your healthcare provider  Manage urticaria:   · Cool your skin  This may help decrease itching  Apply a cool pack to your hives  Dip a hand towel in cool water, wring it out, and place it on your hives  You may also soak your skin in a cool oatmeal bath  · Do not rub your hives  This can irritate your skin and cause more hives  · Wear loose clothing  Tight clothes may irritate your skin and cause more hives  · Manage stress  Stress may trigger hives, or make them worse  Learn new ways to relax, such as deep breathing  Follow up with your healthcare provider as directed:  Write down your questions so you remember to ask them during your visits  © 2017 [employer] Information is for End User's use only and may not be sold, redistributed or otherwise used for commercial purposes  All illustrations and images included in CareNotes® are the copyrighted property of [email or web address]  or [employer] Mcguire  The above information is an  only   It is not intended as medical advice for individual conditions or treatments  Talk to your doctor, nurse or pharmacist before following any medical regimen to see if it is safe and effective for you

## 2018-08-23 NOTE — ED PROVIDER NOTES
History  Chief Complaint   Patient presents with    Allergic Reaction     Pt  reports allergic reaction since yesterday seen yesterday for same given PO meds and discharged  Hives are worse and is now sob  Also reports difficulty swallowing  Took prednisone, pepcid, and Banophen today  Also has facial swelling   Shortness of Breath       History provided by:  Patient  Allergic Reaction   Presenting symptoms: itching, rash and swelling    Severity:  Moderate  Prior allergic episodes:  No prior episodes  Context: not cosmetics, not medications, not new detergents/soaps and not nuts    Relieved by:  Nothing  Worsened by:  Nothing  Ineffective treatments:  Antihistamines and steroids  Shortness of Breath   Associated symptoms: rash        Prior to Admission Medications   Prescriptions Last Dose Informant Patient Reported? Taking? Prenatal Multivit-Min-Fe-FA (PRENATAL VITAMINS PO)   Yes No   Sig: Take 1 tablet by mouth   diphenhydrAMINE (BENADRYL) 25 mg tablet   No No   Sig: Take 1 tablet (25 mg total) by mouth every 6 (six) hours as needed for itching   famotidine (PEPCID) 20 mg tablet   No No   Sig: Take 1 tablet (20 mg total) by mouth 2 (two) times a day for 5 days   predniSONE 10 mg tablet   No No   Sig: Take 6 tabs day 1 and 2, take 5 tabs day 3 and 4, take 4 tabs day 5 and 6, take 3 tabs day 7 and 8, take 2 tabs day 9 and 10, take 1 tab day 11 and 12      Facility-Administered Medications: None       Past Medical History:   Diagnosis Date    Medical history non-contributory        Past Surgical History:   Procedure Laterality Date    NO PAST SURGERIES         History reviewed  No pertinent family history  I have reviewed and agree with the history as documented  Social History   Substance Use Topics    Smoking status: Never Smoker    Smokeless tobacco: Never Used    Alcohol use No        Review of Systems   HENT: Positive for rhinorrhea  Respiratory: Positive for shortness of breath      Skin: Positive for itching and rash  All other systems reviewed and are negative  Physical Exam  Physical Exam   Constitutional: She is oriented to person, place, and time  She appears well-developed and well-nourished  No distress  HENT:   Head: Normocephalic and atraumatic  Right Ear: External ear normal    Left Ear: External ear normal    Eyes: Conjunctivae and EOM are normal  Pupils are equal, round, and reactive to light  No scleral icterus  Edema of the eyelids, right greater than left   Neck: Normal range of motion  Cardiovascular: Normal rate, regular rhythm and normal heart sounds  Pulmonary/Chest: Effort normal and breath sounds normal  No respiratory distress  Abdominal: Soft  Bowel sounds are normal  There is no tenderness  There is no rebound and no guarding  Musculoskeletal: Normal range of motion  She exhibits no edema  Neurological: She is alert and oriented to person, place, and time  Skin: Skin is warm and dry  Rash noted  Patient with diffuse urticaria   Psychiatric: She has a normal mood and affect  Nursing note and vitals reviewed        Vital Signs  ED Triage Vitals [08/23/18 0946]   Temperature Pulse Respirations Blood Pressure SpO2   99 1 °F (37 3 °C) (!) 138 (!) 24 (!) 186/97 96 %      Temp Source Heart Rate Source Patient Position - Orthostatic VS BP Location FiO2 (%)   Oral -- -- -- --      Pain Score       5           Vitals:    08/23/18 0946 08/23/18 1015   BP: (!) 186/97 141/80   Pulse: (!) 138 (!) 114       Visual Acuity      ED Medications  Medications   diphenhydrAMINE (BENADRYL) injection 25 mg (25 mg Intravenous Given 8/23/18 1001)   EPINEPHrine PF (ADRENALIN) 1 mg/mL injection 0 3 mg (0 3 mg Intramuscular Given 8/23/18 0959)       Diagnostic Studies  Results Reviewed     None                 No orders to display              Procedures  Procedures       Phone Contacts  ED Phone Contact    ED Course  ED Course as of Aug 23 1112   Thu Aug 23, 2018   1043 Patient improved upon re-eval  Will continue to observe    1107 Patient continues to improve upon re-evaluation  I discussed the plan for discharge and continued use of the previously prescribed prednisone as well as how to dose the Benadryl  The patient will also be given prescription for an EpiPen  MDM  Number of Diagnoses or Management Options  Allergic reaction, initial encounter: new and requires workup  Urticaria: new and requires workup  Diagnosis management comments: 23-year-old female presents with signs have allergic reaction with diffuse urticaria, rhinorrhea and periorbital edema  The patient was previously placed on antihistamines and prednisone and presents today with worsening symptoms  Plan is to give the patient IM Benadryl and IV antihistamines as the patient already took 60 mg of prednisone this morning  The patient (and any family present) verbalized understanding of the discharge instructions and warnings that would necessitate return to the Emergency Department  All questions were answered prior to discharge  Amount and/or Complexity of Data Reviewed  Review and summarize past medical records: yes      CritCare Time    Disposition  Final diagnoses: Allergic reaction, initial encounter   Urticaria     Time reflects when diagnosis was documented in both MDM as applicable and the Disposition within this note     Time User Action Codes Description Comment    8/23/2018 11:09 AM Ford BONE Add [T78 40XA] Allergic reaction, initial encounter     8/23/2018 11:09 AM Ford BONE Add [L50 9] Urticaria       ED Disposition     ED Disposition Condition Comment    Discharge  Armaan Olmstead discharge to home/self care      Condition at discharge: Good        Follow-up Information     Follow up With Specialties Details Why [facility] In 5 days For further evaluation, if not improved [place] [id number]  [phone]            Patient's Medications   Discharge Prescriptions    EPINEPHRINE (EPIPEN) 0 3 MG/0 3 ML SOAJ    Inject 0 3 mL (0 3 mg total) into a muscle once for 1 dose       Start Date: 8/23/2018 End Date: 8/23/2018       Order Dose: 0 3 mg       Quantity: 0 3 mL    Refills: 1     No discharge procedures on file      ED Provider  Electronically Signed by           Naida Richards DO  08/23/18 1112

## 2018-08-24 ENCOUNTER — HOSPITAL ENCOUNTER (EMERGENCY)
Facility: HOSPITAL | Age: 23
Discharge: HOME | End: 2018-08-24
Attending: EMERGENCY MEDICINE | Admitting: EMERGENCY MEDICINE
Payer: MEDICARE

## 2018-08-24 VITALS
HEART RATE: 94 BPM | TEMPERATURE: 98.5 F | DIASTOLIC BLOOD PRESSURE: 79 MMHG | OXYGEN SATURATION: 99 % | RESPIRATION RATE: 16 BRPM | SYSTOLIC BLOOD PRESSURE: 110 MMHG

## 2018-08-24 DIAGNOSIS — L50.9 URTICARIA: Primary | ICD-10-CM

## 2018-08-24 DIAGNOSIS — B34.9 VIRAL ILLNESS: ICD-10-CM

## 2018-08-24 LAB
ALBUMIN SERPL BCP-MCNC: 3.8 G/DL (ref 3.5–5)
ALP SERPL-CCNC: 91 U/L (ref 46–116)
ALT SERPL W P-5'-P-CCNC: 64 U/L (ref 12–78)
ANION GAP SERPL CALCULATED.3IONS-SCNC: 9 MMOL/L (ref 4–13)
AST SERPL W P-5'-P-CCNC: 27 U/L (ref 5–45)
BACTERIA UR QL AUTO: ABNORMAL /HPF
BASOPHILS # BLD AUTO: 0.01 THOUSANDS/ΜL (ref 0–0.1)
BASOPHILS NFR BLD AUTO: 0 % (ref 0–1)
BILIRUB SERPL-MCNC: 0.24 MG/DL (ref 0.2–1)
BILIRUB UR QL STRIP: NEGATIVE
BUN SERPL-MCNC: 14 MG/DL (ref 5–25)
CALCIUM SERPL-MCNC: 9.3 MG/DL (ref 8.3–10.1)
CHLORIDE SERPL-SCNC: 102 MMOL/L (ref 100–108)
CLARITY UR: CLEAR
CO2 SERPL-SCNC: 29 MMOL/L (ref 21–32)
COLOR UR: YELLOW
CREAT SERPL-MCNC: 0.74 MG/DL (ref 0.6–1.3)
EOSINOPHIL # BLD AUTO: 0.01 THOUSAND/ΜL (ref 0–0.61)
EOSINOPHIL NFR BLD AUTO: 0 % (ref 0–6)
ERYTHROCYTE [DISTWIDTH] IN BLOOD BY AUTOMATED COUNT: 13.1 % (ref 11.6–15.1)
EXT PREG TEST URINE: NORMAL
GFR SERPL CREATININE-BSD FRML MDRD: 113 ML/MIN/1.73SQ M
GLUCOSE SERPL-MCNC: 85 MG/DL (ref 65–140)
GLUCOSE UR STRIP-MCNC: NEGATIVE MG/DL
HCT VFR BLD AUTO: 45.1 % (ref 34.8–46.1)
HGB BLD-MCNC: 15 G/DL (ref 11.5–15.4)
HGB UR QL STRIP.AUTO: NEGATIVE
KETONES UR STRIP-MCNC: NEGATIVE MG/DL
LACTATE SERPL-SCNC: 0.8 MMOL/L (ref 0.5–2)
LEUKOCYTE ESTERASE UR QL STRIP: NEGATIVE
LYMPHOCYTES # BLD AUTO: 2.23 THOUSANDS/ΜL (ref 0.6–4.47)
LYMPHOCYTES NFR BLD AUTO: 18 % (ref 14–44)
MCH RBC QN AUTO: 30.2 PG (ref 26.8–34.3)
MCHC RBC AUTO-ENTMCNC: 33.3 G/DL (ref 31.4–37.4)
MCV RBC AUTO: 91 FL (ref 82–98)
MONOCYTES # BLD AUTO: 1.09 THOUSAND/ΜL (ref 0.17–1.22)
MONOCYTES NFR BLD AUTO: 9 % (ref 4–12)
MUCOUS THREADS UR QL AUTO: ABNORMAL
NEUTROPHILS # BLD AUTO: 9.01 THOUSANDS/ΜL (ref 1.85–7.62)
NEUTS SEG NFR BLD AUTO: 73 % (ref 43–75)
NITRITE UR QL STRIP: NEGATIVE
NON-SQ EPI CELLS URNS QL MICRO: ABNORMAL /HPF
NRBC BLD AUTO-RTO: 0 /100 WBCS
PH UR STRIP.AUTO: 5.5 [PH] (ref 4.5–8)
PLATELET # BLD AUTO: 287 THOUSANDS/UL (ref 149–390)
PMV BLD AUTO: 11.1 FL (ref 8.9–12.7)
POTASSIUM SERPL-SCNC: 3.6 MMOL/L (ref 3.5–5.3)
PROT SERPL-MCNC: 8.5 G/DL (ref 6.4–8.2)
PROT UR STRIP-MCNC: ABNORMAL MG/DL
RBC # BLD AUTO: 4.96 MILLION/UL (ref 3.81–5.12)
RBC #/AREA URNS AUTO: ABNORMAL /HPF
S PYO AG THROAT QL: NEGATIVE
SODIUM SERPL-SCNC: 140 MMOL/L (ref 136–145)
SP GR UR STRIP.AUTO: 1.02 (ref 1–1.03)
TSH SERPL DL<=0.05 MIU/L-ACNC: 0.86 UIU/ML (ref 0.36–3.74)
UROBILINOGEN UR QL STRIP.AUTO: 0.2 E.U./DL
WBC # BLD AUTO: 12.35 THOUSAND/UL (ref 4.31–10.16)
WBC #/AREA URNS AUTO: ABNORMAL /HPF

## 2018-08-24 PROCEDURE — 96361 HYDRATE IV INFUSION ADD-ON: CPT

## 2018-08-24 PROCEDURE — 81001 URINALYSIS AUTO W/SCOPE: CPT

## 2018-08-24 PROCEDURE — 81025 URINE PREGNANCY TEST: CPT | Performed by: EMERGENCY MEDICINE

## 2018-08-24 PROCEDURE — 36415 COLL VENOUS BLD VENIPUNCTURE: CPT | Performed by: EMERGENCY MEDICINE

## 2018-08-24 PROCEDURE — 85025 COMPLETE CBC W/AUTO DIFF WBC: CPT | Performed by: EMERGENCY MEDICINE

## 2018-08-24 PROCEDURE — 84443 ASSAY THYROID STIM HORMONE: CPT | Performed by: EMERGENCY MEDICINE

## 2018-08-24 PROCEDURE — 80053 COMPREHEN METABOLIC PANEL: CPT | Performed by: EMERGENCY MEDICINE

## 2018-08-24 PROCEDURE — 83605 ASSAY OF LACTIC ACID: CPT | Performed by: EMERGENCY MEDICINE

## 2018-08-24 PROCEDURE — 96374 THER/PROPH/DIAG INJ IV PUSH: CPT

## 2018-08-24 PROCEDURE — 99283 EMERGENCY DEPT VISIT LOW MDM: CPT

## 2018-08-24 PROCEDURE — 87430 STREP A AG IA: CPT | Performed by: EMERGENCY MEDICINE

## 2018-08-24 RX ORDER — METHYLPREDNISOLONE SODIUM SUCCINATE 125 MG/2ML
125 INJECTION, POWDER, LYOPHILIZED, FOR SOLUTION INTRAMUSCULAR; INTRAVENOUS ONCE
Status: COMPLETED | OUTPATIENT
Start: 2018-08-24 | End: 2018-08-24

## 2018-08-24 RX ORDER — ACETAMINOPHEN 325 MG/1
650 TABLET ORAL ONCE
Status: COMPLETED | OUTPATIENT
Start: 2018-08-24 | End: 2018-08-24

## 2018-08-24 RX ADMIN — SODIUM CHLORIDE 1000 ML: 0.9 INJECTION, SOLUTION INTRAVENOUS at 11:20

## 2018-08-24 RX ADMIN — ACETAMINOPHEN 650 MG: 325 TABLET, FILM COATED ORAL at 11:16

## 2018-08-24 RX ADMIN — METHYLPREDNISOLONE SODIUM SUCCINATE 125 MG: 125 INJECTION, POWDER, FOR SOLUTION INTRAMUSCULAR; INTRAVENOUS at 11:21

## 2018-08-24 NOTE — DISCHARGE INSTRUCTIONS
Urticaria   WHAT YOU NEED TO KNOW:   Urticaria is also called hives  Hives can change size and shape, and appear anywhere on your skin  They can be mild or severe and last from a few minutes to a few days  Hives may be a sign of a severe allergic reaction called anaphylaxis that needs immediate treatment  Urticaria that lasts longer than 6 weeks may be a chronic condition that needs long-term treatment  DISCHARGE INSTRUCTIONS:   Call 911 for signs or symptoms of anaphylaxis,  such as trouble breathing, swelling in your mouth or throat, or wheezing  You may also have itching, a rash, or feel like you are going to faint  Return to the emergency department if:   · Your heart is beating faster than it normally does  · You have cramping or severe pain in your abdomen  Contact your healthcare provider if:   · You have a fever  · Your skin still itches 24 hours after you take your medicine  · You still have hives after 7 days  · Your joints are painful and swollen  · You have questions or concerns about your condition or care  Medicines:   · Epinephrine  is used to treat severe allergic reactions such as anaphylaxis  · Antihistamines  decrease mild symptoms such as itching or a rash  · Steroids  decrease redness, pain, and swelling  · Take your medicine as directed  Contact your healthcare provider if you think your medicine is not helping or if you have side effects  Tell him of her if you are allergic to any medicine  Keep a list of the medicines, vitamins, and herbs you take  Include the amounts, and when and why you take them  Bring the list or the pill bottles to follow-up visits  Carry your medicine list with you in case of an emergency  Steps to take for signs or symptoms of anaphylaxis:   · Immediately  give 1 shot of epinephrine only into the outer thigh muscle  · Leave the shot in place  as directed   Your healthcare provider may recommend you leave it in place for up to 10 seconds before you remove it  This helps make sure all of the epinephrine is delivered  · Call 911 and go to the emergency department,  even if the shot improved symptoms  Do not drive yourself  Bring the used epinephrine shot with you  Safety precautions to take if you are at risk for anaphylaxis:   · Keep 2 shots of epinephrine with you at all times  You may need a second shot, because epinephrine only works for about 20 minutes and symptoms may return  Your healthcare provider can show you and family members how to give the shot  Check the expiration date every month and replace it before it expires  · Create an action plan  Your healthcare provider can help you create a written plan that explains the allergy and an emergency plan to treat a reaction  The plan explains when to give a second epinephrine shot if symptoms return or do not improve after the first  Give copies of the action plan and emergency instructions to family members, work and school staff, and  providers  Show them how to give a shot of epinephrine  · Be careful when you exercise  If you have had exercise-induced anaphylaxis, do not exercise right after you eat  Stop exercising right away if you start to develop any signs or symptoms of anaphylaxis  You may first feel tired, warm, or have itchy skin  Hives, swelling, and severe breathing problems may develop if you continue to exercise  · Carry medical alert identification  Wear medical alert jewelry or carry a card that explains the allergy  Ask your healthcare provider where to get these items  · Keep a record of triggers and symptoms  Record everything you eat, drink, or apply to your skin for 3 weeks  Include stressful events and what you were doing right before your hives started  Bring the record with you to follow-up visits with your healthcare provider  Manage urticaria:   · Cool your skin  This may help decrease itching  Apply a cool pack to your hives  Dip a hand towel in cool water, wring it out, and place it on your hives  You may also soak your skin in a cool oatmeal bath  · Do not rub your hives  This can irritate your skin and cause more hives  · Wear loose clothing  Tight clothes may irritate your skin and cause more hives  · Manage stress  Stress may trigger hives, or make them worse  Learn new ways to relax, such as deep breathing  Follow up with your healthcare provider as directed:  Write down your questions so you remember to ask them during your visits  © 2017 [employer] Information is for End User's use only and may not be sold, redistributed or otherwise used for commercial purposes  All illustrations and images included in CareNotes® are the copyrighted property of [employer]  or Ross Mcguire  The above information is an  only  It is not intended as medical advice for individual conditions or treatments  Talk to your doctor, nurse or pharmacist before following any medical regimen to see if it is safe and effective for you  Viral Syndrome   WHAT YOU NEED TO KNOW:   Viral syndrome is a term used for a viral infection that has no clear cause  Viruses are spread easily from person to person through the air and on shared items  DISCHARGE INSTRUCTIONS:   Call 911 for the following:   · You have a seizure  · You cannot be woken  · You have chest pain or trouble breathing  Return to the emergency department if:   · You have a stiff neck, a bad headache, and sensitivity to light  · You feel weak, dizzy, or confused  · You stop urinating or urinate a lot less than normal      · You cough up blood or thick, yellow or green, mucus  · You have severe abdominal pain or your abdomen is larger than usual   Contact your healthcare provider if:   · Your symptoms do not get better with treatment, or get worse, after 3 days  · You have a rash or ear pain       · You have burning when you urinate  · You have questions or concerns about your condition or care  Medicines: You may  need any of the following:  · Acetaminophen  decreases pain and fever  It is available without a doctor's order  Ask how much medicine to take and how often to take it  Follow directions  Acetaminophen can cause liver damage if not taken correctly  · NSAIDs , such as ibuprofen, help decrease swelling, pain, and fever  NSAIDs can cause stomach bleeding or kidney problems in certain people  If you take blood thinner medicine, always ask your healthcare provider if NSAIDs are safe for you  Always read the medicine label and follow directions  · Cold medicine  helps decrease swelling, control a cough, and relieve chest or nasal congestion  · Saline nasal spray  helps decrease nasal congestion  · Take your medicine as directed  Contact your healthcare provider if you think your medicine is not helping or if you have side effects  Tell him of her if you are allergic to any medicine  Keep a list of the medicines, vitamins, and herbs you take  Include the amounts, and when and why you take them  Bring the list or the pill bottles to follow-up visits  Carry your medicine list with you in case of an emergency  Manage your symptoms:   · Drink liquids as directed  to prevent dehydration  Ask how much liquid to drink each day and which liquids are best for you  Ask if you should drink an oral rehydration solution (ORS)  An ORS has the right amounts of water, salts, and sugar you need to replace body fluids  This may help prevent dehydration caused by vomiting or diarrhea  Do not drink liquids with caffeine  Drinks with caffeine can make dehydration worse  · Get plenty of rest  to help your body heal  Take naps throughout the day  Ask your healthcare provider when you can return to work and your normal activities       · Use a cool mist humidifier  to help you breathe easier if you have nasal or chest congestion  Ask your healthcare provider how to use a cool mist humidifier  · Eat honey or use cough drops  to help decrease throat discomfort  Ask your healthcare provider how much honey you should eat each day  Cough drops are available without a doctor's order  Follow directions for taking cough drops  · Do not smoke and stay away from others who smoke  Nicotine and other chemicals in cigarettes and cigars can cause lung damage  Smoking can also delay healing  Ask your healthcare provider for information if you currently smoke and need help to quit  E-cigarettes or smokeless tobacco still contain nicotine  Talk to your healthcare provider before you use these products  · Wash your hands frequently  to prevent the spread of germs to others  Use soap and water  Use gel hand  when soap and water are not available  Wash your hands after you use the bathroom, cough, or sneeze  Wash your hands before you prepare or eat food  Follow up with your healthcare provider as directed:  Write down your questions so you remember to ask them during your visits  © 2017 [address] [facility] Information is for End User's use only and may not be sold, redistributed or otherwise used for commercial purposes  All illustrations and images included in CareNotes® are the copyrighted property of [employer]  or [employer] Mcguire  The above information is an  only  It is not intended as medical advice for individual conditions or treatments  Talk to your doctor, nurse or pharmacist before following any medical regimen to see if it is safe and effective for you

## 2018-08-24 NOTE — ED PROVIDER NOTES
History  Chief Complaint   Patient presents with    Itching     pt c/o continued hives, fever, and "throat closing" was seen here the past two days for the same but states symptoms remain     58-year-old female with no past medical history presents to the emergency department with ongoing and worsening hives and now sore throat and feeling like her throat is closing  Patient had been seen here on the 22 and the 23rd of August for similar complaints  She had been treated with oral medications and continues to take steroids, Pepcid and Benadryl without relief  She did have URI symptoms when she presented the 1st time, however her sore throat seems to be getting worse  She felt hot today but did not take her temperature  No shortness of breath  No chest pain  No nausea, vomiting or diarrhea  No prior history of hives in the past   No new medications or recent travel  History provided by:  Patient   used: No    Urticaria   Location:  Diffuse  Severity:  Unable to specify  Onset quality:  Gradual  Duration:  2 days  Timing:  Constant  Progression:  Worsening  Chronicity:  New  Ineffective treatments:  Benadryl, Pepcid, prednisone  Associated symptoms: fever (subjective), rash and sore throat    Associated symptoms: no abdominal pain, no chest pain, no congestion, no cough, no diarrhea, no ear pain, no fatigue, no headaches, no loss of consciousness, no myalgias, no nausea, no rhinorrhea, no shortness of breath, no vomiting and no wheezing    Sore throat:     Severity:  Unable to specify    Onset quality:  Gradual    Duration:  2 days    Timing:  Constant    Progression:  Worsening      Prior to Admission Medications   Prescriptions Last Dose Informant Patient Reported? Taking?    EPINEPHrine (EPIPEN) 0 3 mg/0 3 mL SOAJ   No Yes   Sig: Inject 0 3 mL (0 3 mg total) into a muscle once for 1 dose   Prenatal Multivit-Min-Fe-FA (PRENATAL VITAMINS PO)   Yes Yes   Sig: Take 1 tablet by mouth diphenhydrAMINE (BENADRYL) 25 mg tablet   No Yes   Sig: Take 1 tablet (25 mg total) by mouth every 6 (six) hours as needed for itching   famotidine (PEPCID) 20 mg tablet   No Yes   Sig: Take 1 tablet (20 mg total) by mouth 2 (two) times a day for 5 days   predniSONE 10 mg tablet   No Yes   Sig: Take 6 tabs day 1 and 2, take 5 tabs day 3 and 4, take 4 tabs day 5 and 6, take 3 tabs day 7 and 8, take 2 tabs day 9 and 10, take 1 tab day 11 and 12      Facility-Administered Medications: None       Past Medical History:   Diagnosis Date    Medical history non-contributory        Past Surgical History:   Procedure Laterality Date    NO PAST SURGERIES         History reviewed  No pertinent family history  I have reviewed and agree with the history as documented  Social History   Substance Use Topics    Smoking status: Never Smoker    Smokeless tobacco: Never Used    Alcohol use No        Review of Systems   Constitutional: Positive for fever (subjective)  Negative for activity change, appetite change, chills, diaphoresis and fatigue  HENT: Positive for sore throat and trouble swallowing  Negative for congestion, ear pain and rhinorrhea  Eyes: Negative  Negative for discharge, redness and itching  Respiratory: Negative  Negative for apnea, cough, chest tightness, shortness of breath and wheezing  Cardiovascular: Negative for chest pain, palpitations and leg swelling  Gastrointestinal: Negative  Negative for abdominal pain, diarrhea, nausea and vomiting  Endocrine: Negative  Genitourinary: Negative  Negative for flank pain, frequency and urgency  Musculoskeletal: Negative  Negative for back pain and myalgias  Skin: Positive for rash  Allergic/Immunologic: Negative  Neurological: Negative  Negative for dizziness, loss of consciousness, syncope, weakness, light-headedness, numbness and headaches  Hematological: Negative  All other systems reviewed and are negative        Physical Exam  Physical Exam   Constitutional: She is oriented to person, place, and time  She appears well-developed and well-nourished  Non-toxic appearance  She does not have a sickly appearance  She does not appear ill  No distress  HENT:   Head: Normocephalic and atraumatic  Right Ear: Tympanic membrane and external ear normal    Left Ear: Tympanic membrane and external ear normal    Nose: Nose normal    Mouth/Throat: No trismus in the jaw  No uvula swelling  Posterior oropharyngeal erythema present  No oropharyngeal exudate, posterior oropharyngeal edema or tonsillar abscesses  Eyes: Conjunctivae and EOM are normal  Pupils are equal, round, and reactive to light  Right eye exhibits no discharge  Left eye exhibits no discharge  No scleral icterus  Neck: Normal range of motion and phonation normal  Neck supple  No JVD present  No tracheal deviation present  No thyromegaly present  Cardiovascular: Regular rhythm and normal heart sounds  Tachycardia present  Exam reveals no gallop and no friction rub  No murmur heard  Pulmonary/Chest: Effort normal and breath sounds normal  No stridor  No respiratory distress  She has no wheezes  She has no rales  She exhibits no tenderness  Abdominal: Soft  Bowel sounds are normal  She exhibits no distension and no mass  There is no tenderness  No hernia  Musculoskeletal: Normal range of motion  She exhibits no edema, tenderness or deformity  Lymphadenopathy:     She has no cervical adenopathy  Neurological: She is alert and oriented to person, place, and time  She has normal reflexes  She displays normal reflexes  She exhibits normal muscle tone  Skin: Skin is warm and dry  Rash noted  Rash is urticarial  She is not diaphoretic  No erythema  No pallor  Diffuse urticaria   Psychiatric: She has a normal mood and affect  Nursing note and vitals reviewed        Vital Signs  ED Triage Vitals [08/24/18 1016]   Temperature Pulse Respirations Blood Pressure SpO2 100 3 °F (37 9 °C) (!) 124 18 144/70 99 %      Temp Source Heart Rate Source Patient Position - Orthostatic VS BP Location FiO2 (%)   Temporal Monitor Sitting Right arm --      Pain Score       8           Vitals:    08/24/18 1016 08/24/18 1131 08/24/18 1244   BP: 144/70 122/81 110/79   Pulse: (!) 124 99 94   Patient Position - Orthostatic VS: Sitting         Visual Acuity      ED Medications  Medications   sodium chloride 0 9 % bolus 1,000 mL (0 mL Intravenous Stopped 8/24/18 1243)   acetaminophen (TYLENOL) tablet 650 mg (650 mg Oral Given 8/24/18 1116)   methylPREDNISolone sodium succinate (Solu-MEDROL) injection 125 mg (125 mg Intravenous Given 8/24/18 1121)       Diagnostic Studies  Results Reviewed     Procedure Component Value Units Date/Time    Urine Microscopic [[id number]]  (Abnormal) Collected:  08/24/18 1136    Lab Status:  Final result Specimen:  Urine from Urine, Clean Catch Updated:  08/24/18 1234     RBC, UA 0-1 (A) /hpf      WBC, UA 1-2 (A) /hpf      Epithelial Cells Occasional /hpf      Bacteria, UA Occasional /hpf      MUCOUS THREADS Occasional (A)    Comprehensive metabolic panel [[id number]]  (Abnormal) Collected:  08/24/18 1115    Lab Status:  Final result Specimen:  Blood from Arm, Left Updated:  08/24/18 1200     Sodium 140 mmol/L      Potassium 3 6 mmol/L      Chloride 102 mmol/L      CO2 29 mmol/L      Anion Gap 9 mmol/L      BUN 14 mg/dL      Creatinine 0 74 mg/dL      Glucose 85 mg/dL      Calcium 9 3 mg/dL      AST 27 U/L      ALT 64 U/L      Alkaline Phosphatase 91 U/L      Total Protein 8 5 (H) g/dL      Albumin 3 8 g/dL      Total Bilirubin 0 24 mg/dL      eGFR 113 ml/min/1 73sq m     Narrative:         National Kidney Disease Education Program recommendations are as follows:  GFR calculation is accurate only with a steady state creatinine  Chronic Kidney disease less than 60 ml/min/1 73 sq  meters  Kidney failure less than 15 ml/min/1 73 sq  meters      TSH [[id number]]  (Normal) Collected:  08/24/18 1115    Lab Status:  Final result Specimen:  Blood from Arm, Left Updated:  08/24/18 1200     TSH 3RD GENERATON 0 863 uIU/mL     Narrative:         Patients undergoing fluorescein dye angiography may retain small amounts of fluorescein in the body for 48-72 hours post procedure  Samples containing fluorescein can produce falsely depressed TSH values  If the patient had this procedure,a specimen should be resubmitted post fluorescein clearance  The recommended reference ranges for TSH during pregnancy are as follows:  First trimester 0 1 to 2 5 uIU/mL  Second trimester  0 2 to 3 0 uIU/mL  Third trimester 0 3 to 3 0 uIU/m      Lactic acid, plasma [[id number]]  (Normal) Collected:  08/24/18 1120    Lab Status:  Final result Specimen:  Blood from Arm, Left Updated:  08/24/18 1156     LACTIC ACID 0 8 mmol/L     Narrative:         Result may be elevated if tourniquet was used during collection      CBC and differential [[id number]]  (Abnormal) Collected:  08/24/18 1115    Lab Status:  Final result Specimen:  Blood from Arm, Left Updated:  08/24/18 1147     WBC 12 35 (H) Thousand/uL      RBC 4 96 Million/uL      Hemoglobin 15 0 g/dL      Hematocrit 45 1 %      MCV 91 fL      MCH 30 2 pg      MCHC 33 3 g/dL      RDW 13 1 %      MPV 11 1 fL      Platelets 563 Thousands/uL      nRBC 0 /100 WBCs      Neutrophils Relative 73 %      Lymphocytes Relative 18 %      Monocytes Relative 9 %      Eosinophils Relative 0 %      Basophils Relative 0 %      Neutrophils Absolute 9 01 (H) Thousands/µL      Lymphocytes Absolute 2 23 Thousands/µL      Monocytes Absolute 1 09 Thousand/µL      Eosinophils Absolute 0 01 Thousand/µL      Basophils Absolute 0 01 Thousands/µL     Rapid Strep A Screen Only, Adults [[id number]]  (Normal) Collected:  08/24/18 1116    Lab Status:  Final result Specimen:  Throat from Throat Updated:  08/24/18 1144     Rapid Strep A Screen Negative    POCT pregnancy, urine [[id number]]  (Normal) Resulted:  08/24/18 1128    Lab Status:  Final result Updated:  08/24/18 1130     EXT PREG TEST UR (Ref: Negative) Negative (-)    POCT urinalysis dipstick [[id number]]  (Abnormal) Resulted:  08/24/18 1128    Lab Status:  Final result Specimen:  Urine Updated:  08/24/18 1129    ED Urine Macroscopic [[id number]]  (Abnormal) Collected:  08/24/18 1136    Lab Status:  Final result Specimen:  Urine Updated:  08/24/18 1128     Color, UA Yellow     Clarity, UA Clear     pH, UA 5 5     Leukocytes, UA Negative     Nitrite, UA Negative     Protein, UA Trace (A) mg/dl      Glucose, UA Negative mg/dl      Ketones, UA Negative mg/dl      Urobilinogen, UA 0 2 E U /dl      Bilirubin, UA Negative     Blood, UA Negative     Specific Gravity, UA 1 020    Narrative:       CLINITEK RESULT                 No orders to display              Procedures  Procedures       Phone Contacts  ED Phone Contact    ED Course  ED Course as of Aug 24 1255   Fri Aug 24, 2018   1254 Patient feeling better  Stable for discharge                                MDM  Number of Diagnoses or Management Options  Diagnosis management comments: 78-year-old female presents with ongoing hives and worsening sore throat  She now has a fever and is tachycardic  She has been taking Benadryl, Pepcid and prednisone without any relief of her symptoms  She does have a mildly erythematous pharynx but no angioedema  She does not appear acutely ill or in any distress  Given this is her 3rd visit will do blood work including a lactic acid  Will rule out strep  Will give IV fluids and IV Solu-Medrol and reassess         Amount and/or Complexity of Data Reviewed  Clinical lab tests: ordered and reviewed  Review and summarize past medical records: yes  Independent visualization of images, tracings, or specimens: yes      CritCare Time    Disposition  Final diagnoses:   Urticaria   Viral illness     Time reflects when diagnosis was documented in both MDM as applicable and the Disposition within this note     Time User Action Codes Description Comment    8/24/2018 12:55 PM Louie Rather A Add [L50 9] Urticaria     8/24/2018 12:55 PM Louie Rather A Add [B34 9] Viral illness       ED Disposition     ED Disposition Condition Comment    Discharge  Tio Houston discharge to home/self care  Condition at discharge: Good        Follow-up Information     Follow up With Specialties Details Why [facility] Schedule an appointment as soon as possible for a visit in 2 days If symptoms worsen or return to the emergency department [facility] [id number]  [phone]            Patient's Medications   Discharge Prescriptions    No medications on file     No discharge procedures on file      ED Provider  Electronically Signed by           Gayle Jerome DO  08/24/18 6713

## 2022-01-27 ENCOUNTER — HOSPITAL ENCOUNTER (EMERGENCY)
Facility: HOSPITAL | Age: 27
Discharge: HOME | End: 2022-01-28
Attending: EMERGENCY MEDICINE | Admitting: EMERGENCY MEDICINE
Payer: COMMERCIAL

## 2022-01-27 ENCOUNTER — APPOINTMENT (EMERGENCY)
Dept: CT IMAGING | Facility: HOSPITAL | Age: 27
End: 2022-01-27
Payer: COMMERCIAL

## 2022-01-27 VITALS
OXYGEN SATURATION: 99 % | HEART RATE: 96 BPM | RESPIRATION RATE: 18 BRPM | DIASTOLIC BLOOD PRESSURE: 73 MMHG | SYSTOLIC BLOOD PRESSURE: 133 MMHG | TEMPERATURE: 98.8 F

## 2022-01-27 DIAGNOSIS — K65.1 PERITONEAL ABSCESS (HCC): Primary | ICD-10-CM

## 2022-01-27 LAB
ANION GAP SERPL CALCULATED.3IONS-SCNC: 8 MMOL/L (ref 4–13)
BACTERIA UR QL AUTO: ABNORMAL /HPF
BASOPHILS # BLD AUTO: 0.04 THOUSANDS/ΜL (ref 0–0.1)
BASOPHILS NFR BLD AUTO: 0 % (ref 0–1)
BILIRUB UR QL STRIP: NEGATIVE
BUN SERPL-MCNC: 12 MG/DL (ref 5–25)
CALCIUM SERPL-MCNC: 8.5 MG/DL (ref 8.3–10.1)
CHLORIDE SERPL-SCNC: 105 MMOL/L (ref 100–108)
CLARITY UR: CLEAR
CO2 SERPL-SCNC: 28 MMOL/L (ref 21–32)
COLOR UR: YELLOW
CREAT SERPL-MCNC: 0.79 MG/DL (ref 0.6–1.3)
EOSINOPHIL # BLD AUTO: 0.07 THOUSAND/ΜL (ref 0–0.61)
EOSINOPHIL NFR BLD AUTO: 1 % (ref 0–6)
ERYTHROCYTE [DISTWIDTH] IN BLOOD BY AUTOMATED COUNT: 12.9 % (ref 11.6–15.1)
EXT PREG TEST URINE: NEGATIVE
EXT. CONTROL ED NAV: NORMAL
GFR SERPL CREATININE-BSD FRML MDRD: 102 ML/MIN/1.73SQ M
GLUCOSE SERPL-MCNC: 88 MG/DL (ref 65–140)
GLUCOSE UR STRIP-MCNC: NEGATIVE MG/DL
HCT VFR BLD AUTO: 40.3 % (ref 34.8–46.1)
HGB BLD-MCNC: 13.5 G/DL (ref 11.5–15.4)
HGB UR QL STRIP.AUTO: NEGATIVE
IMM GRANULOCYTES # BLD AUTO: 0.05 THOUSAND/UL (ref 0–0.2)
IMM GRANULOCYTES NFR BLD AUTO: 0 % (ref 0–2)
KETONES UR STRIP-MCNC: ABNORMAL MG/DL
LEUKOCYTE ESTERASE UR QL STRIP: NEGATIVE
LYMPHOCYTES # BLD AUTO: 2.12 THOUSANDS/ΜL (ref 0.6–4.47)
LYMPHOCYTES NFR BLD AUTO: 17 % (ref 14–44)
MCH RBC QN AUTO: 30.6 PG (ref 26.8–34.3)
MCHC RBC AUTO-ENTMCNC: 33.5 G/DL (ref 31.4–37.4)
MCV RBC AUTO: 91 FL (ref 82–98)
MONOCYTES # BLD AUTO: 1.75 THOUSAND/ΜL (ref 0.17–1.22)
MONOCYTES NFR BLD AUTO: 14 % (ref 4–12)
NEUTROPHILS # BLD AUTO: 8.85 THOUSANDS/ΜL (ref 1.85–7.62)
NEUTS SEG NFR BLD AUTO: 68 % (ref 43–75)
NITRITE UR QL STRIP: NEGATIVE
NON-SQ EPI CELLS URNS QL MICRO: ABNORMAL /HPF
NRBC BLD AUTO-RTO: 0 /100 WBCS
PH UR STRIP.AUTO: 7 [PH] (ref 4.5–8)
PLATELET # BLD AUTO: 237 THOUSANDS/UL (ref 149–390)
PMV BLD AUTO: 10.3 FL (ref 8.9–12.7)
POTASSIUM SERPL-SCNC: 3.6 MMOL/L (ref 3.5–5.3)
PROT UR STRIP-MCNC: ABNORMAL MG/DL
RBC # BLD AUTO: 4.41 MILLION/UL (ref 3.81–5.12)
RBC #/AREA URNS AUTO: ABNORMAL /HPF
SODIUM SERPL-SCNC: 141 MMOL/L (ref 136–145)
SP GR UR STRIP.AUTO: 1.02 (ref 1–1.03)
UROBILINOGEN UR QL STRIP.AUTO: 4 E.U./DL
WBC # BLD AUTO: 12.88 THOUSAND/UL (ref 4.31–10.16)
WBC #/AREA URNS AUTO: ABNORMAL /HPF

## 2022-01-27 PROCEDURE — 70491 CT SOFT TISSUE NECK W/DYE: CPT

## 2022-01-27 PROCEDURE — 81025 URINE PREGNANCY TEST: CPT | Performed by: EMERGENCY MEDICINE

## 2022-01-27 PROCEDURE — 96374 THER/PROPH/DIAG INJ IV PUSH: CPT

## 2022-01-27 PROCEDURE — 80048 BASIC METABOLIC PNL TOTAL CA: CPT | Performed by: EMERGENCY MEDICINE

## 2022-01-27 PROCEDURE — 99283 EMERGENCY DEPT VISIT LOW MDM: CPT

## 2022-01-27 PROCEDURE — 99285 EMERGENCY DEPT VISIT HI MDM: CPT | Performed by: EMERGENCY MEDICINE

## 2022-01-27 PROCEDURE — 81001 URINALYSIS AUTO W/SCOPE: CPT

## 2022-01-27 PROCEDURE — 36415 COLL VENOUS BLD VENIPUNCTURE: CPT | Performed by: EMERGENCY MEDICINE

## 2022-01-27 PROCEDURE — 85025 COMPLETE CBC W/AUTO DIFF WBC: CPT | Performed by: EMERGENCY MEDICINE

## 2022-01-27 RX ORDER — KETOROLAC TROMETHAMINE 30 MG/ML
15 INJECTION, SOLUTION INTRAMUSCULAR; INTRAVENOUS ONCE
Status: COMPLETED | OUTPATIENT
Start: 2022-01-27 | End: 2022-01-27

## 2022-01-27 RX ADMIN — IOHEXOL 85 ML: 350 INJECTION, SOLUTION INTRAVENOUS at 22:52

## 2022-01-27 RX ADMIN — DEXAMETHASONE SODIUM PHOSPHATE 10 MG: 10 INJECTION, SOLUTION INTRAMUSCULAR; INTRAVENOUS at 22:29

## 2022-01-27 RX ADMIN — KETOROLAC TROMETHAMINE 15 MG: 30 INJECTION, SOLUTION INTRAMUSCULAR at 22:29

## 2022-01-28 PROCEDURE — 87070 CULTURE OTHR SPECIMN AEROBIC: CPT | Performed by: OTOLARYNGOLOGY

## 2022-01-28 PROCEDURE — 87205 SMEAR GRAM STAIN: CPT | Performed by: OTOLARYNGOLOGY

## 2022-01-28 PROCEDURE — 96365 THER/PROPH/DIAG IV INF INIT: CPT

## 2022-01-28 PROCEDURE — 96375 TX/PRO/DX INJ NEW DRUG ADDON: CPT

## 2022-01-28 RX ORDER — CLINDAMYCIN PHOSPHATE 600 MG/50ML
600 INJECTION INTRAVENOUS ONCE
Status: COMPLETED | OUTPATIENT
Start: 2022-01-28 | End: 2022-01-28

## 2022-01-28 RX ORDER — DEXAMETHASONE SODIUM PHOSPHATE 4 MG/ML
10 INJECTION, SOLUTION INTRA-ARTICULAR; INTRALESIONAL; INTRAMUSCULAR; INTRAVENOUS; SOFT TISSUE ONCE
Status: COMPLETED | OUTPATIENT
Start: 2022-01-28 | End: 2022-01-28

## 2022-01-28 RX ADMIN — CLINDAMYCIN PHOSPHATE 600 MG: 600 INJECTION, SOLUTION INTRAVENOUS at 00:24

## 2022-01-28 RX ADMIN — DEXAMETHASONE SODIUM PHOSPHATE 10 MG: 4 INJECTION INTRA-ARTICULAR; INTRALESIONAL; INTRAMUSCULAR; INTRAVENOUS; SOFT TISSUE at 00:24

## 2022-01-28 NOTE — ED ATTENDING ATTESTATION
1/27/2022  IRaymon MD, saw and evaluated the patient  I have discussed the patient with the resident/non-physician practitioner and agree with the resident's/non-physician practitioner's findings, Plan of Care, and MDM as documented in the resident's/non-physician practitioner's note, except where noted  All available labs and Radiology studies were reviewed  I was present for key portions of any procedure(s) performed by the resident/non-physician practitioner and I was immediately available to provide assistance  At this point I agree with the current assessment done in the Emergency Department  I have conducted an independent evaluation of this patient a history and physical is as follows:        Final Diagnosis:  1  Peritoneal abscess [facility])      Chief Complaint   Patient presents with    Sore Throat     states has sore throat, hurts to swallow and talk  pt talking in full sentences during triage  This is an otherwise healthy 53-year-old female presents with a sore throat  Starting on Tuesday, patient started to experience a sore throat  She had a telemedicine visit yesterday and was prescribed amoxicillin  However, she continues to experience worsening sore throat  Denies any difficulty swallowing  Just states that she is having pain with swallowing  PMH:  - not applicable  PSH:  - not applicable    PE:   Vitals:    01/27/22 2149   BP: 133/73   BP Location: Right arm   Pulse: 96   Resp: 18   Temp: 98 8 °F (37 1 °C)   TempSrc: Oral   SpO2: 99%           Constitutional: Vital signs are normal  She appears well-developed  She is cooperative  No distress  HENT:   Mouth/Throat:  Uvula is deviated to the left  Peritonsillar swelling and erythema on the right  Tonsillar exudate bilaterally  Slightly muffled voice  Eyes: Pupils are equal, round, and reactive to light  Conjunctivae and EOM are normal    Neck: Trachea normal  No thyroid mass and no thyromegaly present  Cardiovascular: Normal rate, regular rhythm  Pulmonary/Chest: Effort normal and breath sounds normal    Abdominal: Soft  Normal appearance  Neurological: She is alert  Skin: Skin is warm, dry and intact  Psychiatric: She has a normal mood and affect  Her speech is normal and behavior is normal  Thought content normal          A:  - this is a 47-year-old female who presents with a sore throat  P:  - concern for peritonsillar abscess  Will check labs  CT soft tissue neck  Analgesia and Decadron  - 13 point ROS was performed and all are normal unless stated in the history above  - Nursing note reviewed  Vitals reviewed  - Orders placed by myself and/or advanced practitioner / resident     - Previous chart was reviewed  - No language barrier    - History obtained from patient  - There are no limitations to the history obtained  - Critical care time: Not applicable for this patient  Medications   clindamycin (CLEOCIN) IVPB (premix in dextrose) 600 mg 50 mL (600 mg Intravenous New Bag 1/28/22 0024)   dexamethasone oral liquid 10 mg 1 mL (10 mg Oral Given 1/27/22 2229)   ketorolac (TORADOL) injection 15 mg (15 mg Intravenous Given 1/27/22 2229)   iohexol (OMNIPAQUE) 350 MG/ML injection (SINGLE-DOSE) 85 mL (85 mL Intravenous Given 1/27/22 2252)   dexamethasone (DECADRON) injection 10 mg (10 mg Intravenous Given 1/28/22 0024)     CT soft tissue neck   Final Result      Findings consistent 2 7 cm right peritonsillar abscess        Findings consistent with  right sialadenitis which is likely reactive      Workstation performed: [id number]           Orders Placed This Encounter   Procedures    CT soft tissue neck    CBC and differential    Basic metabolic panel    Urine Microscopic    Insert peripheral IV    POCT pregnancy, urine     Labs Reviewed   CBC AND DIFFERENTIAL - Abnormal       Result Value Ref Range Status    WBC 12 88 (*) 4 31 - 10 16 Thousand/uL Final    RBC 4 41  3 81 - 5 12 Million/uL Final    Hemoglobin 13 5  11 5 - 15 4 g/dL Final    Hematocrit 40 3  34 8 - 46 1 % Final    MCV 91  82 - 98 fL Final    MCH 30 6  26 8 - 34 3 pg Final    MCHC 33 5  31 4 - 37 4 g/dL Final    RDW 12 9  11 6 - 15 1 % Final    MPV 10 3  8 9 - 12 7 fL Final    Platelets 393  011 - 390 Thousands/uL Final    nRBC 0  /100 WBCs Final    Neutrophils Relative 68  43 - 75 % Final    Immat GRANS % 0  0 - 2 % Final    Lymphocytes Relative 17  14 - 44 % Final    Monocytes Relative 14 (*) 4 - 12 % Final    Eosinophils Relative 1  0 - 6 % Final    Basophils Relative 0  0 - 1 % Final    Neutrophils Absolute 8 85 (*) 1 85 - 7 62 Thousands/µL Final    Immature Grans Absolute 0 05  0 00 - 0 20 Thousand/uL Final    Lymphocytes Absolute 2 12  0 60 - 4 47 Thousands/µL Final    Monocytes Absolute 1 75 (*) 0 17 - 1 22 Thousand/µL Final    Eosinophils Absolute 0 07  0 00 - 0 61 Thousand/µL Final    Basophils Absolute 0 04  0 00 - 0 10 Thousands/µL Final   URINE MICROSCOPIC - Abnormal    RBC, UA 0-1 (*) None Seen, 2-4 /hpf Final    WBC, UA 0-1 (*) None Seen, 2-4, 5-60 /hpf Final    Epithelial Cells Occasional  None Seen, Occasional /hpf Final    Bacteria, UA Occasional  None Seen, Occasional /hpf Final   URINE MACROSCOPIC, POC - Abnormal    Color, UA Yellow   Final    Clarity, UA Clear   Final    pH, UA 7 0  4 5 - 8 0 Final    Leukocytes, UA Negative  Negative Final    Nitrite, UA Negative  Negative Final    Protein, UA Trace (*) Negative mg/dl Final    Glucose, UA Negative  Negative mg/dl Final    Ketones, UA Trace (*) Negative mg/dl Final    Urobilinogen, UA 4 0 (*) 0 2, 1 0 E U /dl E U /dl Final    Bilirubin, UA Negative  Negative Final    Blood, UA Negative  Negative Final    Specific Gravity, UA 1 025  1 003 - 1 030 Final    Narrative:     CLINITEK RESULT   POCT PREGNANCY, URINE - Normal    EXT PREG TEST UR (Ref: Negative) negative   Final    Control valid   Final   BASIC METABOLIC PANEL    Sodium 277  136 - 145 mmol/L Final Potassium 3 6  3 5 - 5 3 mmol/L Final    Chloride 105  100 - 108 mmol/L Final    CO2 28  21 - 32 mmol/L Final    ANION GAP 8  4 - 13 mmol/L Final    BUN 12  5 - 25 mg/dL Final    Creatinine 0 79  0 60 - 1 30 mg/dL Final    Comment: Standardized to IDMS reference method    Glucose 88  65 - 140 mg/dL Final    Comment: If the patient is fasting, the ADA then defines impaired fasting glucose as > 100 mg/dL and diabetes as > or equal to 123 mg/dL  Specimen collection should occur prior to Sulfasalazine administration due to the potential for falsely depressed results  Specimen collection should occur prior to Sulfapyridine administration due to the potential for falsely elevated results  Calcium 8 5  8 3 - 10 1 mg/dL Final    eGFR 102  ml/min/1 73sq m Final    Narrative:     [place] guidelines for Chronic Kidney Disease (CKD):     Stage 1 with normal or high GFR (GFR > 90 mL/min/1 73 square meters)    Stage 2 Mild CKD (GFR = 60-89 mL/min/1 73 square meters)    Stage 3A Moderate CKD (GFR = 45-59 mL/min/1 73 square meters)    Stage 3B Moderate CKD (GFR = 30-44 mL/min/1 73 square meters)    Stage 4 Severe CKD (GFR = 15-29 mL/min/1 73 square meters)    Stage 5 End Stage CKD (GFR <15 mL/min/1 73 square meters)  Note: GFR calculation is accurate only with a steady state creatinine     Time reflects when diagnosis was documented in both MDM as applicable and the Disposition within this note     Time User Action Codes Description Comment    1/27/2022 11:36 PM Rangel Wadsworth Add [K65 1] Peritoneal abscess [facility])       ED Disposition     ED Disposition Condition Date/Time Comment    Discharge Stable Fri Jan 28, 2022 12:39 AM Alex Kim discharge to home/self care              Follow-up Information     Follow up With Specialties Details Why Contact Info Additional Hawk Monteiro, DO Otolaryngology Call in 1 day  [address] [facility] [address] Emergency Department Emergency Medicine Go to  If symptoms worsen [facility] [id number]  [address] [facility] Emergency Department, [address] , [place], [place], [address]        Patient's Medications   Discharge Prescriptions    No medications on file     No discharge procedures on file  Prior to Admission Medications   Prescriptions Last Dose Informant Patient Reported? Taking? EPINEPHrine (EPIPEN) 0 3 mg/0 3 mL SOAJ   No No   Sig: Inject 0 3 mL (0 3 mg total) into a muscle once for 1 dose   Prenatal Multivit-Min-Fe-FA (PRENATAL VITAMINS PO)   Yes No   Sig: Take 1 tablet by mouth   diphenhydrAMINE (BENADRYL) 25 mg tablet Not Taking at Unknown time  No No   Sig: Take 1 tablet (25 mg total) by mouth every 6 (six) hours as needed for itching   Patient not taking: Reported on 1/27/2022    famotidine (PEPCID) 20 mg tablet   No No   Sig: Take 1 tablet (20 mg total) by mouth 2 (two) times a day for 5 days   predniSONE 10 mg tablet Not Taking at Unknown time  No No   Sig: Take 6 tabs day 1 and 2, take 5 tabs day 3 and 4, take 4 tabs day 5 and 6, take 3 tabs day 7 and 8, take 2 tabs day 9 and 10, take 1 tab day 11 and 12   Patient not taking: Reported on 1/27/2022       Facility-Administered Medications: None       Portions of the record may have been created with voice recognition software  Occasional wrong word or "sound a like" substitutions may have occurred due to the inherent limitations of voice recognition software  Read the chart carefully and recognize, using context, where substitutions have occurred          ED Course         Critical Care Time  Procedures

## 2022-01-28 NOTE — ED PROVIDER NOTES
History  Chief Complaint   Patient presents with    Sore Throat     states has sore throat, hurts to swallow and talk  pt talking in full sentences during triage  18-year-old female with no significant past medical history presents to the emergency department for evaluation of a sore throat  The patient reports that her sore throat started 2 days ago on Tuesday  Reports that she had a telemedicine visit yesterday and was prescribed amoxicillin  States that she started taking the amoxicillin but feels that her sore throat is getting worse and was having subjective fevers  She reports significant pain with swallowing  Also reports having some pressure in her right ear  The patient has been treating her symptoms with Tylenol  Reports that the last dose of Tylenol was 3 hours ago  She denies nausea, vomiting, diarrhea, sick contacts and difficulty breathing  Patient reports that she is still able to swallow her secretions but it is painful  Prior to Admission Medications   Prescriptions Last Dose Informant Patient Reported? Taking?    EPINEPHrine (EPIPEN) 0 3 mg/0 3 mL SOAJ   No No   Sig: Inject 0 3 mL (0 3 mg total) into a muscle once for 1 dose   Prenatal Multivit-Min-Fe-FA (PRENATAL VITAMINS PO)   Yes No   Sig: Take 1 tablet by mouth   diphenhydrAMINE (BENADRYL) 25 mg tablet Not Taking at Unknown time  No No   Sig: Take 1 tablet (25 mg total) by mouth every 6 (six) hours as needed for itching   Patient not taking: Reported on 1/27/2022    famotidine (PEPCID) 20 mg tablet   No No   Sig: Take 1 tablet (20 mg total) by mouth 2 (two) times a day for 5 days   predniSONE 10 mg tablet Not Taking at Unknown time  No No   Sig: Take 6 tabs day 1 and 2, take 5 tabs day 3 and 4, take 4 tabs day 5 and 6, take 3 tabs day 7 and 8, take 2 tabs day 9 and 10, take 1 tab day 11 and 12   Patient not taking: Reported on 1/27/2022       Facility-Administered Medications: None       Past Medical History:   Diagnosis Date    Medical history non-contributory        Past Surgical History:   Procedure Laterality Date    NO PAST SURGERIES         History reviewed  No pertinent family history  I have reviewed and agree with the history as documented  E-Cigarette/Vaping     E-Cigarette/Vaping Substances     Social History     Tobacco Use    Smoking status: Never Smoker    Smokeless tobacco: Never Used   Substance Use Topics    Alcohol use: Yes    Drug use: No        Review of Systems   Constitutional: Positive for fever  Negative for chills  HENT: Positive for ear pain, sore throat, trouble swallowing and voice change  Eyes: Negative for pain and visual disturbance  Respiratory: Negative for cough and shortness of breath  Cardiovascular: Negative for chest pain and palpitations  Gastrointestinal: Negative for abdominal pain and vomiting  Genitourinary: Negative for dysuria and hematuria  Musculoskeletal: Negative for arthralgias and back pain  Skin: Negative for color change and rash  Neurological: Negative for seizures and syncope  All other systems reviewed and are negative  Physical Exam  ED Triage Vitals [01/27/22 2149]   Temperature Pulse Respirations Blood Pressure SpO2   98 8 °F (37 1 °C) 96 18 133/73 99 %      Temp Source Heart Rate Source Patient Position - Orthostatic VS BP Location FiO2 (%)   Oral -- Sitting Right arm --      Pain Score       --             Orthostatic Vital Signs  Vitals:    01/27/22 2149   BP: 133/73   Pulse: 96   Patient Position - Orthostatic VS: Sitting       Physical Exam  Vitals and nursing note reviewed  Constitutional:       General: She is not in acute distress  Appearance: She is well-developed  HENT:      Head: Normocephalic and atraumatic  Mouth/Throat:      Pharynx: Posterior oropharyngeal erythema present  Comments: Erythema and swelling noted most prominent on the right  Uvula deviated to the left    Eyes:      Conjunctiva/sclera: Conjunctivae normal    Cardiovascular:      Rate and Rhythm: Normal rate and regular rhythm  Heart sounds: No murmur heard  Pulmonary:      Effort: Pulmonary effort is normal  No respiratory distress  Breath sounds: Normal breath sounds  Abdominal:      Palpations: Abdomen is soft  Tenderness: There is no abdominal tenderness  Musculoskeletal:      Cervical back: Neck supple  Skin:     General: Skin is warm and dry  Neurological:      Mental Status: She is alert           ED Medications  Medications   clindamycin (CLEOCIN) IVPB (premix in dextrose) 600 mg 50 mL (600 mg Intravenous New Bag 1/28/22 0024)   dexamethasone oral liquid 10 mg 1 mL (10 mg Oral Given 1/27/22 2229)   ketorolac (TORADOL) injection 15 mg (15 mg Intravenous Given 1/27/22 2229)   iohexol (OMNIPAQUE) 350 MG/ML injection (SINGLE-DOSE) 85 mL (85 mL Intravenous Given 1/27/22 2252)   dexamethasone (DECADRON) injection 10 mg (10 mg Intravenous Given 1/28/22 0024)       Diagnostic Studies  Results Reviewed     Procedure Component Value Units Date/Time    Basic metabolic panel [[id number]] Collected: 01/27/22 2228    Lab Status: Final result Specimen: Blood from Arm, Right Updated: 01/27/22 2243     Sodium 141 mmol/L      Potassium 3 6 mmol/L      Chloride 105 mmol/L      CO2 28 mmol/L      ANION GAP 8 mmol/L      BUN 12 mg/dL      Creatinine 0 79 mg/dL      Glucose 88 mg/dL      Calcium 8 5 mg/dL      eGFR 102 ml/min/1 73sq m     Narrative:      [place] guidelines for Chronic Kidney Disease (CKD):     Stage 1 with normal or high GFR (GFR > 90 mL/min/1 73 square meters)    Stage 2 Mild CKD (GFR = 60-89 mL/min/1 73 square meters)    Stage 3A Moderate CKD (GFR = 45-59 mL/min/1 73 square meters)    Stage 3B Moderate CKD (GFR = 30-44 mL/min/1 73 square meters)    Stage 4 Severe CKD (GFR = 15-29 mL/min/1 73 square meters)    Stage 5 End Stage CKD (GFR <15 mL/min/1 73 square meters)  Note: GFR calculation is accurate only with a steady state creatinine    Urine Microscopic [[id number]]  (Abnormal) Collected: 01/27/22 2221    Lab Status: Final result Specimen: Urine, Other Updated: 01/27/22 2238     RBC, UA 0-1 /hpf      WBC, UA 0-1 /hpf      Epithelial Cells Occasional /hpf      Bacteria, UA Occasional /hpf     CBC and differential [[id number]]  (Abnormal) Collected: 01/27/22 2228    Lab Status: Final result Specimen: Blood from Arm, Right Updated: 01/27/22 2234     WBC 12 88 Thousand/uL      RBC 4 41 Million/uL      Hemoglobin 13 5 g/dL      Hematocrit 40 3 %      MCV 91 fL      MCH 30 6 pg      MCHC 33 5 g/dL      RDW 12 9 %      MPV 10 3 fL      Platelets 525 Thousands/uL      nRBC 0 /100 WBCs      Neutrophils Relative 68 %      Immat GRANS % 0 %      Lymphocytes Relative 17 %      Monocytes Relative 14 %      Eosinophils Relative 1 %      Basophils Relative 0 %      Neutrophils Absolute 8 85 Thousands/µL      Immature Grans Absolute 0 05 Thousand/uL      Lymphocytes Absolute 2 12 Thousands/µL      Monocytes Absolute 1 75 Thousand/µL      Eosinophils Absolute 0 07 Thousand/µL      Basophils Absolute 0 04 Thousands/µL     POCT pregnancy, urine [[id number]]  (Normal) Resulted: 01/27/22 2223    Lab Status: Final result Updated: 01/27/22 2223     EXT PREG TEST UR (Ref: Negative) negative     Control valid    Urine Macroscopic, POC [[id number]]  (Abnormal) Collected: 01/27/22 2221    Lab Status: Final result Specimen: Urine Updated: 01/27/22 2222     Color, UA Yellow     Clarity, UA Clear     pH, UA 7 0     Leukocytes, UA Negative     Nitrite, UA Negative     Protein, UA Trace mg/dl      Glucose, UA Negative mg/dl      Ketones, UA Trace mg/dl      Urobilinogen, UA 4 0 E U /dl      Bilirubin, UA Negative     Blood, UA Negative     Specific Gravity, UA 1 025    Narrative:      CLINITEK RESULT                 CT soft tissue neck   Final Result by Maurice Dougherty MD (01/27 2321)      Findings consistent 2 7 cm right peritonsillar abscess  Findings consistent with  right sialadenitis which is likely reactive      Workstation performed: [id number]               Procedures  Procedures      ED Course  ED Course as of 01/28/22 0049   Fri Jan 28, 2022   0020 Discussed with on-call ENT  The patient was able to tolerate a p o  Challenge  Per ENT recommendations I will give the patient a dose of IV steroids as well as IV clindamycin and have her follow-up with ENT in the morning  Plan were discussed with the patient and she is agreeable with the plan  MDM  Number of Diagnoses or Management Options  Peritoneal abscess [facility])  Diagnosis management comments: 68-year-old female presented to the emergency department for evaluation of a sore throat  On arrival the patient was awake, alert, oriented and in no acute distress  Initial vital signs within normal limits  The patient was afebrile  Clinical exam was suspicious for a peritonsillar abscess with a leftward deviation of the patient's uvula as well as erythema and swelling most prominent on the right  Patient's voice also sounded mildly muffled  The patient was initially treated with analgesic medication as well as oral Decadron  Imaging confirmed a peritonsillar abscess  The case was discussed with the on-call ENT who recommended p o  Challenging the patient and if she can not tolerate it to give her a dose of IV steroids and IV clindamycin and have her follow-up with ENT tomorrow morning for drainage  The patient successfully passed a p o  Challenge and was given 10 mg IV Decadron as well as 600 mg IV clindamycin  The patient's name and MRN was provided to the on-call ENT for follow-up tomorrow  The patient is appropriate for discharge at this time  Follow-up instructions were given  Return precautions were discussed  Patient agrees with the plan for discharge and feels comfortable to go home with proper f/u   Advised to return for worsening or additional problems  Diagnostic tests were reviewed and questions answered  Diagnosis, care plan and treatment options were discussed  The patient understands instructions and will follow up as directed  Disposition  Final diagnoses:   Peritoneal abscess [facility])     Time reflects when diagnosis was documented in both MDM as applicable and the Disposition within this note     Time User Action Codes Description Comment    1/27/2022 11:36 PM Jodi Tanya Add [K65 1] Peritoneal abscess [facility])       ED Disposition     ED Disposition Condition Date/Time Comment    Discharge Stable Fri Jan 28, 2022 12:39 AM Swathi Dinero discharge to home/self care  Follow-up Information     Follow up With Specialties Details Why Contact Info Additional Hawk 57, DO Otolaryngology Call in 1 day  [address]  [address] [address]  [phone]       [facility] Emergency Department Emergency Medicine Go to  If symptoms worsen [facility] [id number] [facility] Emergency Department, [address] [place] [address], [facility]          Patient's Medications   Discharge Prescriptions    No medications on file     No discharge procedures on file  PDMP Review     None           ED Provider  Attending physically available and evaluated Swathi Dinero I managed the patient along with the ED Attending      Electronically Signed by         Jen Loza MD  01/28/22 8927